# Patient Record
Sex: MALE | Race: WHITE | NOT HISPANIC OR LATINO | Employment: FULL TIME | URBAN - METROPOLITAN AREA
[De-identification: names, ages, dates, MRNs, and addresses within clinical notes are randomized per-mention and may not be internally consistent; named-entity substitution may affect disease eponyms.]

---

## 2017-11-14 ENCOUNTER — APPOINTMENT (EMERGENCY)
Dept: RADIOLOGY | Facility: HOSPITAL | Age: 37
End: 2017-11-14

## 2017-11-14 ENCOUNTER — HOSPITAL ENCOUNTER (EMERGENCY)
Facility: HOSPITAL | Age: 37
Discharge: HOME | End: 2017-11-14
Attending: EMERGENCY MEDICINE | Admitting: EMERGENCY MEDICINE

## 2017-11-14 VITALS
SYSTOLIC BLOOD PRESSURE: 159 MMHG | WEIGHT: 250 LBS | HEART RATE: 99 BPM | DIASTOLIC BLOOD PRESSURE: 93 MMHG | BODY MASS INDEX: 33.86 KG/M2 | TEMPERATURE: 98.4 F | OXYGEN SATURATION: 99 % | HEIGHT: 72 IN | RESPIRATION RATE: 20 BRPM

## 2017-11-14 DIAGNOSIS — M70.52 PATELLAR BURSITIS OF LEFT KNEE: Primary | ICD-10-CM

## 2017-11-14 PROCEDURE — 73564 X-RAY EXAM KNEE 4 OR MORE: CPT

## 2017-11-14 PROCEDURE — 99283 EMERGENCY DEPT VISIT LOW MDM: CPT

## 2017-11-14 RX ORDER — IBUPROFEN 600 MG/1
600 TABLET ORAL ONCE
Status: COMPLETED | OUTPATIENT
Start: 2017-11-14 | End: 2017-11-14

## 2017-11-14 RX ORDER — IBUPROFEN 600 MG/1
600 TABLET ORAL EVERY 6 HOURS PRN
Qty: 30 TABLET | Refills: 0 | Status: SHIPPED | OUTPATIENT
Start: 2017-11-14 | End: 2017-11-24

## 2017-11-14 RX ORDER — IBUPROFEN 600 MG/1
600 TABLET ORAL ONCE
Status: DISCONTINUED | OUTPATIENT
Start: 2017-11-14 | End: 2017-11-14 | Stop reason: HOSPADM

## 2017-11-14 RX ADMIN — IBUPROFEN 600 MG: 600 TABLET ORAL at 10:10

## 2017-11-14 NOTE — ED PROVIDER NOTES
History  Chief Complaint   Patient presents with    Knee Pain     Patient c/o of left knee pain that he states started 2 days ago, denies any acute injury  History provided by:  Patient  Knee Pain   Location:  Knee  Time since incident:  2 days  Injury: no    Knee location:  L knee  Pain details:     Quality:  Aching    Radiates to:  Does not radiate    Severity:  Moderate    Onset quality:  Gradual    Duration:  2 days    Timing:  Constant    Progression:  Worsening  Chronicity:  New  Dislocation: no    Prior injury to area:  Yes (had a prior staph infection years ago)  Relieved by:  None tried  Worsened by:  Extension and flexion  Ineffective treatments:  None tried  Associated symptoms: no back pain, no decreased ROM, no fatigue, no fever, no neck pain, no numbness, no swelling and no tingling    Risk factors: no concern for non-accidental trauma and no frequent fractures        None       History reviewed  No pertinent past medical history  Past Surgical History:   Procedure Laterality Date    KNEE SURGERY         History reviewed  No pertinent family history  I have reviewed and agree with the history as documented  Social History   Substance Use Topics    Smoking status: Never Smoker    Smokeless tobacco: Never Used    Alcohol use No        Review of Systems   Constitutional: Negative for fatigue and fever  Musculoskeletal: Negative for back pain and neck pain  All other systems reviewed and are negative  Physical Exam  ED Triage Vitals [11/14/17 0920]   Temperature Pulse Respirations Blood Pressure SpO2   98 4 °F (36 9 °C) 99 20 159/93 99 %      Temp src Heart Rate Source Patient Position - Orthostatic VS BP Location FiO2 (%)   -- -- -- Right arm --      Pain Score       6           Orthostatic Vital Signs  Vitals:    11/14/17 0920   BP: 159/93   Pulse: 99       Physical Exam   Constitutional: He is oriented to person, place, and time   He appears well-developed and well-nourished  No distress  HENT:   Head: Normocephalic and atraumatic  Eyes: Pupils are equal, round, and reactive to light  Neck: Neck supple  Cardiovascular: Normal rate and intact distal pulses  Pulmonary/Chest: Effort normal and breath sounds normal  No respiratory distress  Abdominal: Soft  Bowel sounds are normal    Musculoskeletal: Normal range of motion  Left knee: He exhibits normal range of motion, no swelling, no ecchymosis, no deformity, no laceration, no erythema and normal patellar mobility  Tenderness found  Patellar tendon tenderness noted  Left knee with small area of swelling just inferior to the patella in area of tendon and bursa, able to flex/extend left knee, no overlying erythema or cellulitis, mildly tender    Neurological: He is alert and oriented to person, place, and time  Skin: Skin is warm and dry  He is not diaphoretic  Psychiatric: He has a normal mood and affect  Nursing note and vitals reviewed        ED Medications  Medications   ibuprofen (MOTRIN) tablet 600 mg (not administered)   ibuprofen (MOTRIN) tablet 600 mg (not administered)       Diagnostic Studies  Results Reviewed     None                 XR knee 4+ vw left injury   ED Interpretation by Malissa Patel MD (11/14 1002)   NAD                 Procedures  Procedures       Phone Contacts  ED Phone Contact    ED Course  ED Course                                MDM  Number of Diagnoses or Management Options  Patellar bursitis of left knee: new and requires workup     Amount and/or Complexity of Data Reviewed  Tests in the radiology section of CPT®: reviewed and ordered      CritCare Time    Disposition  Final diagnoses:   Patellar bursitis of left knee     Time reflects when diagnosis was documented in both MDM as applicable and the Disposition within this note     Time User Action Codes Description Comment    11/14/2017 10:07 AM Mare IVY Add [M70 52] Patellar bursitis of left knee       ED Disposition     ED Disposition Condition Comment    Discharge  Cristobal Moe discharge to home/self care  Condition at discharge: Good        Follow-up Information     Follow up With Specialties Details Why [facility] Emergency Department Emergency Medicine   [address] [facility] [address]  [phone] [place] ED, [address] [place], [place], [address] [place] [address] MD [facility]   [address] [place] [id number]           Patient's Medications   Discharge Prescriptions    IBUPROFEN (MOTRIN) 600 MG TABLET    Take 1 tablet by mouth every 6 (six) hours as needed for mild pain for up to 10 days       Start Date: 11/14/2017End Date: 11/24/2017       Order Dose: 600 mg       Quantity: 30 tablet    Refills: 0     No discharge procedures on file      ED Provider  Electronically Signed by           Elmira Gifford MD  11/14/17 1441

## 2017-11-14 NOTE — DISCHARGE INSTRUCTIONS
Knee Bursitis   WHAT YOU NEED TO KNOW:   Knee bursitis is inflammation of the bursa in your knee  The bursa is a fluid-filled sac that acts as a cushion between a bone and a tendon  A tendon is a cord of strong tissue that connects muscles to bones  DISCHARGE INSTRUCTIONS:   Medicines:   · NSAIDs:  These medicines decrease swelling, pain, and fever  NSAIDs are available without a doctor's order  Ask your healthcare provider which medicine is right for you  Ask how much to take and when to take it  Take as directed  NSAIDs can cause stomach bleeding and kidney problems if not taken correctly  · Antibiotics: These help fight an infection caused by bacteria  You may need antibiotics if your bursitis is caused by infection  · Take your medicine as directed  Contact your healthcare provider if you think your medicine is not helping or if you have side effects  Tell him of her if you are allergic to any medicine  Keep a list of the medicines, vitamins, and herbs you take  Include the amounts, and when and why you take them  Bring the list or the pill bottles to follow-up visits  Carry your medicine list with you in case of an emergency  Manage your symptoms:   · Rest:  Rest your knee as much as possible to decrease pain and swelling  Slowly start to do more each day  Return to your daily activities as directed  · Ice:  Ice helps decrease swelling and pain  Ice may also help prevent tissue damage  Use an ice pack, or put crushed ice in a plastic bag  Cover it with a towel and place it on your knee for 15 to 20 minutes, 3 to 4 times each day, as directed  · Heat:  Heat helps decrease pain and stiffness  Apply heat on the area for 15 to 20 minutes, 3 to 4 times each day, as directed  · Compression:  Healthcare providers may wrap your knee with tape or an elastic bandage to decrease swelling  Loosen the elastic bandage if you start to lose feeling in your toes      · Elevation:  Raise your knee above the level of your heart as often as you can  This will help decrease swelling and pain  Prop your knee on pillows or blankets to keep it elevated comfortably  Physical therapy:  A physical therapist teaches you exercises to help improve movement and strength, and to decrease pain  Prevent another knee injury:   · Stretch, warm up, and cool down:  Always stretch and do warmup and cool-down exercises before and after you exercise  This will help loosen your muscles and decrease stress on your knees  Rest between workouts  · Protect your knees:  Use kneepads when you kneel on a hard surface and when you play sports  Stand and walk around every 20 minutes if you have to kneel for a long period of time  Follow up with your healthcare provider as directed:  Write down your questions so you remember to ask them during your visits  Contact your healthcare provider if:   · Your pain and swelling increase  · Your symptoms do not improve with treatment  · You have a fever  · You have questions or concerns about your condition or care  © 2017 [employer]  Information is for End User's use only and may not be sold, redistributed or otherwise used for commercial purposes  All illustrations and images included in CareNotes® are the copyrighted property of [employer]  or [employer]  The above information is an  only  It is not intended as medical advice for individual conditions or treatments  Talk to your doctor, nurse or pharmacist before following any medical regimen to see if it is safe and effective for you

## 2018-04-17 ENCOUNTER — APPOINTMENT (EMERGENCY)
Dept: RADIOLOGY | Facility: HOSPITAL | Age: 38
End: 2018-04-17

## 2018-04-17 ENCOUNTER — APPOINTMENT (EMERGENCY)
Dept: CT IMAGING | Facility: HOSPITAL | Age: 38
End: 2018-04-17

## 2018-04-17 ENCOUNTER — HOSPITAL ENCOUNTER (EMERGENCY)
Facility: HOSPITAL | Age: 38
Discharge: HOME | End: 2018-04-17
Attending: EMERGENCY MEDICINE

## 2018-04-17 VITALS
HEIGHT: 72 IN | SYSTOLIC BLOOD PRESSURE: 114 MMHG | TEMPERATURE: 97.9 F | RESPIRATION RATE: 16 BRPM | WEIGHT: 246.25 LBS | HEART RATE: 82 BPM | BODY MASS INDEX: 33.35 KG/M2 | OXYGEN SATURATION: 97 % | DIASTOLIC BLOOD PRESSURE: 81 MMHG

## 2018-04-17 DIAGNOSIS — R07.89 RIGHT-SIDED CHEST WALL PAIN: Primary | ICD-10-CM

## 2018-04-17 DIAGNOSIS — R73.9 HYPERGLYCEMIA: ICD-10-CM

## 2018-04-17 DIAGNOSIS — S00.81XA ABRASION OF FOREHEAD, INITIAL ENCOUNTER: ICD-10-CM

## 2018-04-17 DIAGNOSIS — R51.9 HEADACHE: ICD-10-CM

## 2018-04-17 LAB
ALBUMIN SERPL BCP-MCNC: 3.5 G/DL (ref 3.5–5)
ALP SERPL-CCNC: 74 U/L (ref 46–116)
ALT SERPL W P-5'-P-CCNC: 45 U/L (ref 12–78)
ANION GAP SERPL CALCULATED.3IONS-SCNC: 10 MMOL/L (ref 4–13)
AST SERPL W P-5'-P-CCNC: 24 U/L (ref 5–45)
ATRIAL RATE: 98 BPM
BASOPHILS # BLD AUTO: 0.03 THOUSANDS/ΜL (ref 0–0.1)
BASOPHILS NFR BLD AUTO: 0 % (ref 0–1)
BILIRUB SERPL-MCNC: 0.5 MG/DL (ref 0.2–1)
BUN SERPL-MCNC: 10 MG/DL (ref 5–25)
CALCIUM SERPL-MCNC: 8.2 MG/DL (ref 8.3–10.1)
CHLORIDE SERPL-SCNC: 107 MMOL/L (ref 100–108)
CO2 SERPL-SCNC: 26 MMOL/L (ref 21–32)
CREAT SERPL-MCNC: 1.02 MG/DL (ref 0.6–1.3)
EOSINOPHIL # BLD AUTO: 0.03 THOUSAND/ΜL (ref 0–0.61)
EOSINOPHIL NFR BLD AUTO: 0 % (ref 0–6)
ERYTHROCYTE [DISTWIDTH] IN BLOOD BY AUTOMATED COUNT: 12.5 % (ref 11.6–15.1)
GFR SERPL CREATININE-BSD FRML MDRD: 93 ML/MIN/1.73SQ M
GLUCOSE SERPL-MCNC: 205 MG/DL (ref 65–140)
HCT VFR BLD AUTO: 45.8 % (ref 36.5–49.3)
HGB BLD-MCNC: 15.9 G/DL (ref 12–17)
LYMPHOCYTES # BLD AUTO: 1.46 THOUSANDS/ΜL (ref 0.6–4.47)
LYMPHOCYTES NFR BLD AUTO: 21 % (ref 14–44)
MCH RBC QN AUTO: 29.9 PG (ref 26.8–34.3)
MCHC RBC AUTO-ENTMCNC: 34.7 G/DL (ref 31.4–37.4)
MCV RBC AUTO: 86 FL (ref 82–98)
MONOCYTES # BLD AUTO: 0.4 THOUSAND/ΜL (ref 0.17–1.22)
MONOCYTES NFR BLD AUTO: 6 % (ref 4–12)
NEUTROPHILS # BLD AUTO: 4.98 THOUSANDS/ΜL (ref 1.85–7.62)
NEUTS SEG NFR BLD AUTO: 72 % (ref 43–75)
NRBC BLD AUTO-RTO: 0 /100 WBCS
P AXIS: 39 DEGREES
PLATELET # BLD AUTO: 249 THOUSANDS/UL (ref 149–390)
PMV BLD AUTO: 9.4 FL (ref 8.9–12.7)
POTASSIUM SERPL-SCNC: 4.1 MMOL/L (ref 3.5–5.3)
PR INTERVAL: 150 MS
PROT SERPL-MCNC: 7.4 G/DL (ref 6.4–8.2)
QRS AXIS: -25 DEGREES
QRSD INTERVAL: 106 MS
QT INTERVAL: 344 MS
QTC INTERVAL: 439 MS
RBC # BLD AUTO: 5.32 MILLION/UL (ref 3.88–5.62)
SODIUM SERPL-SCNC: 143 MMOL/L (ref 136–145)
T WAVE AXIS: 27 DEGREES
TROPONIN I SERPL-MCNC: <0.02 NG/ML
VENTRICULAR RATE: 98 BPM
WBC # BLD AUTO: 6.94 THOUSAND/UL (ref 4.31–10.16)

## 2018-04-17 PROCEDURE — 80053 COMPREHEN METABOLIC PANEL: CPT | Performed by: EMERGENCY MEDICINE

## 2018-04-17 PROCEDURE — 36415 COLL VENOUS BLD VENIPUNCTURE: CPT

## 2018-04-17 PROCEDURE — 70450 CT HEAD/BRAIN W/O DYE: CPT

## 2018-04-17 PROCEDURE — 84484 ASSAY OF TROPONIN QUANT: CPT | Performed by: EMERGENCY MEDICINE

## 2018-04-17 PROCEDURE — 96374 THER/PROPH/DIAG INJ IV PUSH: CPT

## 2018-04-17 PROCEDURE — 99284 EMERGENCY DEPT VISIT MOD MDM: CPT

## 2018-04-17 PROCEDURE — 71260 CT THORAX DX C+: CPT

## 2018-04-17 PROCEDURE — 93005 ELECTROCARDIOGRAM TRACING: CPT

## 2018-04-17 PROCEDURE — 93010 ELECTROCARDIOGRAM REPORT: CPT | Performed by: INTERNAL MEDICINE

## 2018-04-17 PROCEDURE — 71046 X-RAY EXAM CHEST 2 VIEWS: CPT

## 2018-04-17 PROCEDURE — 96375 TX/PRO/DX INJ NEW DRUG ADDON: CPT

## 2018-04-17 PROCEDURE — 85025 COMPLETE CBC W/AUTO DIFF WBC: CPT | Performed by: EMERGENCY MEDICINE

## 2018-04-17 PROCEDURE — 96376 TX/PRO/DX INJ SAME DRUG ADON: CPT

## 2018-04-17 RX ORDER — FENTANYL CITRATE 50 UG/ML
50 INJECTION, SOLUTION INTRAMUSCULAR; INTRAVENOUS ONCE
Status: COMPLETED | OUTPATIENT
Start: 2018-04-17 | End: 2018-04-17

## 2018-04-17 RX ORDER — KETOROLAC TROMETHAMINE 30 MG/ML
15 INJECTION, SOLUTION INTRAMUSCULAR; INTRAVENOUS ONCE
Status: COMPLETED | OUTPATIENT
Start: 2018-04-17 | End: 2018-04-17

## 2018-04-17 RX ORDER — NAPROXEN 500 MG/1
500 TABLET ORAL 2 TIMES DAILY PRN
Qty: 20 TABLET | Refills: 0 | Status: SHIPPED | OUTPATIENT
Start: 2018-04-17

## 2018-04-17 RX ORDER — MORPHINE SULFATE 15 MG/1
15 TABLET ORAL EVERY 6 HOURS PRN
Qty: 5 TABLET | Refills: 0 | Status: SHIPPED | OUTPATIENT
Start: 2018-04-17 | End: 2018-04-24

## 2018-04-17 RX ADMIN — IOHEXOL 85 ML: 350 INJECTION, SOLUTION INTRAVENOUS at 02:34

## 2018-04-17 RX ADMIN — FENTANYL CITRATE 50 MCG: 50 INJECTION, SOLUTION INTRAMUSCULAR; INTRAVENOUS at 01:59

## 2018-04-17 RX ADMIN — FENTANYL CITRATE 50 MCG: 50 INJECTION, SOLUTION INTRAMUSCULAR; INTRAVENOUS at 03:34

## 2018-04-17 RX ADMIN — KETOROLAC TROMETHAMINE 15 MG: 30 INJECTION, SOLUTION INTRAMUSCULAR at 03:34

## 2018-04-17 NOTE — DISCHARGE INSTRUCTIONS
Abrasion   WHAT YOU NEED TO KNOW:   An abrasion is a scrape on your skin  It happens when your skin rubs against a rough surface  Some examples of an abrasion include rug burn, a skinned elbow, or road rash  Abrasions can be many shapes and sizes  The wound may hurt, bleed, bruise, or swell  DISCHARGE INSTRUCTIONS:   Return to the emergency department if:   · The bleeding does not stop after 10 minutes of firm pressure  · You cannot rinse one or more foreign objects out of your wound  · You have red streaks on your skin coming from your wound  Contact your healthcare provider if:   · You have a fever or chills  · Your abrasion is red, warm, swollen, or draining pus  · You have questions or concerns about your condition or care  Care for your abrasion:   · Wash your hands and dry them with a clean towel  · Press a clean cloth against your wound to stop any bleeding  · Rinse your wound with a lot of clean water  Do not use harsh soap, alcohol, or iodine solutions  · Use a clean, wet cloth to remove any objects, such as small pieces of rocks or dirt  · Rub antibiotic ointment on your wound  This may help prevent infection and help your wound heal     · Cover the wound with a non-stick bandage  Change the bandage daily, and if gets wet or dirty  Follow up with your healthcare provider as directed:  Write down your questions so you remember to ask them during your visits  © 2017 [employer] Information is for End User's use only and may not be sold, redistributed or otherwise used for commercial purposes  All illustrations and images included in CareNotes® are the copyrighted property of [employer]  or [employer]  The above information is an  only  It is not intended as medical advice for individual conditions or treatments   Talk to your doctor, nurse or pharmacist before following any medical regimen to see if it is safe and effective for you     Chest Wall Pain   WHAT YOU NEED TO KNOW:   Chest wall pain may be caused by problems with the muscles, cartilage, or bones of the chest wall  Chest wall pain may also be caused by pain that spreads to your chest from another part of your body  The pain may be aching, severe, dull, or sharp  It may come and go, or it may be constant  The pain may be worse when you move in certain ways, breathe deeply, or cough  DISCHARGE INSTRUCTIONS:   Call 911 if:   · You have any of the following signs of a heart attack:      ¨ Squeezing, pressure, or pain in your chest that lasts longer than 5 minutes or returns    ¨ Discomfort or pain in your back, neck, jaw, stomach, or arm     ¨ Trouble breathing    ¨ Nausea or vomiting    ¨ Lightheadedness or a sudden cold sweat, especially with chest pain or trouble breathing    Return to the emergency department if:   · You have severe pain  Contact your healthcare provider if:   · You develop a rash  · You have other new symptoms  · Your pain does not improve, even with treatment  · You have questions or concerns about your condition or care  Medicines: You may need any of the following:  · NSAIDs , such as ibuprofen, help decrease swelling, pain, and fever  This medicine is available with or without a doctor's order  NSAIDs can cause stomach bleeding or kidney problems in certain people  If you take blood thinner medicine, always ask your healthcare provider if NSAIDs are safe for you  Always read the medicine label and follow directions  · Acetaminophen  decreases pain  It is available without a doctor's order  Ask how much to take and how often to take it  Follow directions  Acetaminophen can cause liver damage if not taken correctly  · A cream  may be applied to your chest to decrease pain  · Take your medicine as directed  Contact your healthcare provider if you think your medicine is not helping or if you have side effects   Tell him of her if you are allergic to any medicine  Keep a list of the medicines, vitamins, and herbs you take  Include the amounts, and when and why you take them  Bring the list or the pill bottles to follow-up visits  Carry your medicine list with you in case of an emergency  Follow up with your healthcare provider as directed:  Write down your questions so you remember to ask them during your visits  Self-care:   · Rest  as needed  Avoid activities that make your chest wall pain worse  · Apply heat  on your chest for 20 to 30 minutes every 2 hours for as many days as directed  Heat helps decrease pain and muscle spasms  · Apply ice  on your chest for 15 to 20 minutes every hour or as directed  Use an ice pack, or put crushed ice in a plastic bag  Cover it with a towel  Ice helps prevent tissue damage and decreases swelling and pain  © 2017 [facility] Information is for End User's use only and may not be sold, redistributed or otherwise used for commercial purposes  All illustrations and images included in CareNotes® are the copyrighted property of [employer]  or [employer]  The above information is an  only  It is not intended as medical advice for individual conditions or treatments  Talk to your doctor, nurse or pharmacist before following any medical regimen to see if it is safe and effective for you  Head Injury   WHAT YOU NEED TO KNOW:   A head injury is most often caused by a blow to the head  This may occur from a fall, bicycle injury, sports injury, being struck in the head, or a motor vehicle accident  DISCHARGE INSTRUCTIONS:   Call 911 or have someone else call for any of the following:   · You cannot be woken  · You have a seizure  · You stop responding to others or you faint  · You have blurry or double vision  · Your speech becomes slurred or confused      · You have arm or leg weakness, loss of feeling, or new problems with coordination  · Your pupils are larger than usual or one pupil is a different size than the other  · You have blood or clear fluid coming out of your ears or nose  Return to the emergency department if:   · You have repeated or forceful vomiting  · You feel confused  · Your headache gets worse or becomes severe  · You or someone caring for you notices that you are harder to wake than usual   Contact your healthcare provider if:   · Your symptoms last longer than 6 weeks after the injury  · You have questions or concerns about your condition or care  Medicines:   · Acetaminophen  decreases pain  Acetaminophen is available without a doctor's order  Ask how much to take and how often to take it  Follow directions  Acetaminophen can cause liver damage if not taken correctly  · Take your medicine as directed  Contact your healthcare provider if you think your medicine is not helping or if you have side effects  Tell him or her if you are allergic to any medicine  Keep a list of the medicines, vitamins, and herbs you take  Include the amounts, and when and why you take them  Bring the list or the pill bottles to follow-up visits  Carry your medicine list with you in case of an emergency  Self-care:   · Rest  or do quiet activities for 24 to 48 hours  Limit your time watching TV, using the computer, or doing tasks that require a lot of thinking  Slowly return to your normal activities as directed  Do not play sports or do activities that may cause you to get hit in the head  Ask your healthcare provider when you can return to sports  · Apply ice  on your head for 15 to 20 minutes every hour or as directed  Use an ice pack, or put crushed ice in a plastic bag  Cover it with a towel before you apply it to your skin  Ice helps prevent tissue damage and decreases swelling and pain  · Have someone stay with you for 24 hours  or as directed   This person can monitor you for complications and call 911  When you are awake the person should ask you a few questions to see if you are thinking clearly  An example would be to ask your name or your address  Prevent another head injury:   · Wear a helmet that fits properly  Do this when you play sports, or ride a bike, scooter, or skateboard  Helmets help decrease your risk of a serious head injury  Talk to your healthcare provider about other ways you can protect yourself if you play sports  · Wear your seat belt every time you are in a car  This helps to decrease your risk for a head injury if you are in a car accident  Follow up with your healthcare provider as directed:  Write down your questions so you remember to ask them during your visits  © 2017 [address] [facility] Information is for End User's use only and may not be sold, redistributed or otherwise used for commercial purposes  All illustrations and images included in CareNotes® are the copyrighted property of [employer]  or [employer]  The above information is an  only  It is not intended as medical advice for individual conditions or treatments  Talk to your doctor, nurse or pharmacist before following any medical regimen to see if it is safe and effective for you

## 2018-04-17 NOTE — ED PROVIDER NOTES
History  Chief Complaint   Patient presents with   Cathy Dickersona     states "about 2 hrs ago   fell and hit head and lost concious for a few minutes" c/o head pain and right chest wall pain deies being on blood thinners and vomiting     History of Present Illness   44 y o  male presents to the ED after fall while leaving the bathroom approximately 3 hours ago  Patient affirms striking his head and affirms any loss of consciousness  Unclear if syncope prior to or after the episode  Patient states the fall occurred at home  Patient currently complains of right chest wall pain, denies any chest pain prior to the episode  No anticoagulation or antiplatelet agents  ROS: Patient denies any headache though he does not tenderness around an abrasion on the forehead, abdominal pain, extremity pain, fever/chills, nausea/vomiting, visual changes, diarrhea, dyspnea, cough, arthralgia, dysuria  All other systems reviewed and are negative  PHYSICAL EXAM:   Primary Exam   A: Patent   B: Bilateral equal breath sounds   C: Pulses intact in all extremities, no active bleeding   D: No signs of gross motor or cognitive neurologic impairment     Secondary Exam   Constitutional: No acute distress  HENT: Normocephalic  Abrasion with contusion noted on forehead with minimal tenderness  Normal pharyngeal exam  No hemotympanum, raccoon eyes or Mathis sign  Eyes: No hyphema  EOMI  PERRL  Neck: No midline tenderness, supple  No midline tenderness  CV: Regular rate and rhythm, no murmur  Peripheral pulses intact  Respiratory: No traumatic findings  Lungs clear to auscultation bilaterally  Chest discretely tender on anterior inferior ribs to palpitation, directly over the patient's pain though no traumatic findings on inspection  Abdomen: No traumatic findings  Soft, Non-tender, non-distended  Back: No vertebral tenderness, step-offs or crepitus  Skin: Normal color, warm and dry   Extremities: Non-tender, no deformities  Neuro: Awake, alert, no gross sensory or motor deficits     Medical Decision Making   50-year-old male presenting status post fall at home with unclear etiology of the patient's fall  Patient with discretely tender chest wall pain, denies preceding symptoms  Syncope with a broad differential, patient has no high risk factors for syncopal event  Will obtain cardiac evaluation considering post fall chest wall pain though this is most likely secondary to patient's fall  Place patient on cardiac monitoring and evaluate  CBC to evaluate for potential anemia  Regarding patient's fall, will obtain CT imaging of patient's head varied for intracranial pathology considering traumatic findings and unclear etiology of syncopal event  Obtain CT imaging of patient's chest considering fall with discretely tender pain associated with syncopal event  Will treat symptomatically and reassess  Will place patient on incentive spirometer in case of potential for rib fractures  Fall       Prior to Admission Medications   Prescriptions Last Dose Informant Patient Reported? Taking?   ibuprofen (MOTRIN) 600 mg tablet   No No   Sig: Take 1 tablet by mouth every 6 (six) hours as needed for mild pain for up to 10 days      Facility-Administered Medications: None       History reviewed  No pertinent past medical history  Past Surgical History:   Procedure Laterality Date    KNEE SURGERY         History reviewed  No pertinent family history  I have reviewed and agree with the history as documented  Social History   Substance Use Topics    Smoking status: Never Smoker    Smokeless tobacco: Never Used    Alcohol use No        Review of Systems   All other systems reviewed and are negative        Physical Exam  ED Triage Vitals   Temperature Pulse Respirations Blood Pressure SpO2   04/17/18 0114 04/17/18 0112 04/17/18 0112 04/17/18 0112 04/17/18 0113   97 9 °F (36 6 °C) 100 18 130/72 95 %      Temp Source Heart Rate Source Patient Position - Orthostatic VS BP Location FiO2 (%)   04/17/18 0114 04/17/18 0112 04/17/18 0415 04/17/18 0415 --   Oral Monitor Lying Right arm       Pain Score       04/17/18 0109       Worst Possible Pain           Orthostatic Vital Signs  Vitals:    04/17/18 0112 04/17/18 0415   BP: 130/72 114/81   Pulse: 100 82   Patient Position - Orthostatic VS:  Lying       Physical Exam    ED Medications  Medications   fentanyl citrate (PF) 100 MCG/2ML 50 mcg (50 mcg Intravenous Given 4/17/18 0159)   iohexol (OMNIPAQUE) 350 MG/ML injection (MULTI-DOSE) 100 mL (85 mL Intravenous Given 4/17/18 0234)   fentanyl citrate (PF) 100 MCG/2ML 50 mcg (50 mcg Intravenous Given 4/17/18 0334)   ketorolac (TORADOL) injection 15 mg (15 mg Intravenous Given 4/17/18 0334)       Diagnostic Studies  Results Reviewed     Procedure Component Value Units Date/Time    Troponin I [[id number]]  (Normal) Collected:  04/17/18 0128    Lab Status:  Final result Specimen:  Blood from Arm, Right Updated:  04/17/18 0154     Troponin I <0 02 ng/mL     Narrative:         [employer] Chemistry analyzer 99% cutoff is > 0 04 ng/mL in network labs    o cTnI 99% cutoff is useful only when applied to patients in the clinical setting of myocardial ischemia  o cTnI 99% cutoff should be interpreted in the context of clinical history, ECG findings and possibly cardiac imaging to establish correct diagnosis  o cTnI 99% cutoff may be suggestive but clearly not indicative of a coronary event without the clinical setting of myocardial ischemia      Comprehensive metabolic panel [[id number]]  (Abnormal) Collected:  04/17/18 0128    Lab Status:  Final result Specimen:  Blood from Arm, Right Updated:  04/17/18 0151     Sodium 143 mmol/L      Potassium 4 1 mmol/L      Chloride 107 mmol/L      CO2 26 mmol/L      Anion Gap 10 mmol/L      BUN 10 mg/dL      Creatinine 1 02 mg/dL      Glucose 205 (H) mg/dL      Calcium 8 2 (L) mg/dL      AST 24 U/L      ALT 45 U/L      Alkaline Phosphatase 74 U/L      Total Protein 7 4 g/dL      Albumin 3 5 g/dL      Total Bilirubin 0 50 mg/dL      eGFR 93 ml/min/1 73sq m     Narrative:         National Kidney Disease Education Program recommendations are as follows:  GFR calculation is accurate only with a steady state creatinine  Chronic Kidney disease less than 60 ml/min/1 73 sq  meters  Kidney failure less than 15 ml/min/1 73 sq  meters  CBC and differential [[id number]]  (Normal) Collected:  04/17/18 0128    Lab Status:  Final result Specimen:  Blood from Arm, Right Updated:  04/17/18 0136     WBC 6 94 Thousand/uL      RBC 5 32 Million/uL      Hemoglobin 15 9 g/dL      Hematocrit 45 8 %      MCV 86 fL      MCH 29 9 pg      MCHC 34 7 g/dL      RDW 12 5 %      MPV 9 4 fL      Platelets 546 Thousands/uL      nRBC 0 /100 WBCs      Neutrophils Relative 72 %      Lymphocytes Relative 21 %      Monocytes Relative 6 %      Eosinophils Relative 0 %      Basophils Relative 0 %      Neutrophils Absolute 4 98 Thousands/µL      Lymphocytes Absolute 1 46 Thousands/µL      Monocytes Absolute 0 40 Thousand/µL      Eosinophils Absolute 0 03 Thousand/µL      Basophils Absolute 0 03 Thousands/µL                  X-ray chest 2 views   Final Result by Belia Zavala MD (04/17 7188)      No acute cardiopulmonary disease  Workstation performed: [id number]         CT chest w contrast   ED Interpretation by Damien Wolf MD (04/17 9499)   IMPRESSION:   No acute findings  Final Result by Yaneth Fernandez MD (04/17 3650)      No acute thoracic process  No acute fracture  Minimal focal postinflammatory reticulonodular disease inferior posterior segment right upper lobe  Right 8th costochondral junction remote posttraumatic deformity  No overlying soft tissue swelling        Findings are consistent with the preliminary report from [facility] which was provided shortly after completion of the exam                   Workstation performed: [id number]         CT head without contrast   Final Result by Saskia Starkey MD (04/17 0256)      No acute intracranial process  No acute skull fracture  Incidentally noted disconjugate gaze with left globe lateral deviation  Correlate with clinical findings  Findings are consistent with the preliminary report from [facility] which was provided shortly after completion of the exam                            Workstation performed: [id number]                    Procedures  Procedures       Phone Contacts  ED Phone Contact    ED Course  ED Course as of Apr 23 2109   Tue Apr 17, 2018   0409  aware queried no matching patient's     8135 Discussed findings with the patient  Discussed need for follow-up with primary care physician regarding his glucose  Discussed symptomatic management in detail including continued use of incentive spirometer as potential for costochondritis could lead to pneumonia  Discussed these risks  Discussed risks and benefits of pain medication, including opiate use  Will prescribe small course of opiates due to potential risk for pneumonia if pain is not adequately controlled  Patient has old rib fracture and does note prior event sometime ago that was imaged with plain film x-ray  Discussed follow-up and return precautions in detail  7799 Patient ambulated around the emergency room without difficulty  Patient given incentive spirometer by nursing and instructed in its use                                  [facility]  CritCare Time    Disposition  Final diagnoses:   Right-sided chest wall pain   Headache   Abrasion of forehead, initial encounter   Hyperglycemia     Time reflects when diagnosis was documented in both MDM as applicable and the Disposition within this note     Time User Action Codes Description Comment    4/17/2018  2:53 AM Linard Stai Add [R07 89] Right-sided chest wall pain     4/17/2018  2:53 AM Linard Stai Add [R51] Headache 4/17/2018  2:53 AM Derrel Lory Ball [S00 81XA] Abrasion of forehead, initial encounter     4/17/2018  2:53 AM Derrel Lory Add [R73 9] Hyperglycemia       ED Disposition     ED Disposition Condition Comment    Discharge  Jber Nayaerger discharge to home/self care  Condition at discharge: Stable        MD Documentation    [address] Most Recent Value   Accepting Physician  Jai Name, [address]   Sending MD Sosa Ron      RN Documentation    [address], [address]   Bed Assignment  866   Report Given to  [facility], RN      Follow-up Information     Follow up With Specialties Details Why Contact Info    Vale Garcia MD Family Medicine In 3 days Follow-up and reassessment  Evaluate for potential diabetes  [address]          Discharge Medication List as of 4/17/2018  3:00 AM      START taking these medications    Details   naproxen (NAPROSYN) 500 mg tablet Take 1 tablet (500 mg total) by mouth 2 (two) times a day as needed for mild pain (take with food) for up to 20 doses, Starting Tue 4/17/2018, Print         CONTINUE these medications which have NOT CHANGED    Details   ibuprofen (MOTRIN) 600 mg tablet Take 1 tablet by mouth every 6 (six) hours as needed for mild pain for up to 10 days, Starting Tue 11/14/2017, Until Fri 11/24/2017, Print           No discharge procedures on file      ED Provider  Electronically Signed by           Yisel Grace MD  04/23/18 3832

## 2018-04-17 NOTE — ED NOTES
Right upper chest and axillary pain on palpation, inspiration and movement     Phylicia An RN  04/17/18 9803

## 2021-02-16 ENCOUNTER — HOSPITAL ENCOUNTER (EMERGENCY)
Facility: HOSPITAL | Age: 41
Discharge: HOME | End: 2021-02-16
Attending: EMERGENCY MEDICINE | Admitting: EMERGENCY MEDICINE
Payer: COMMERCIAL

## 2021-02-16 ENCOUNTER — APPOINTMENT (EMERGENCY)
Dept: RADIOLOGY | Facility: HOSPITAL | Age: 41
End: 2021-02-16
Payer: COMMERCIAL

## 2021-02-16 VITALS
DIASTOLIC BLOOD PRESSURE: 95 MMHG | SYSTOLIC BLOOD PRESSURE: 138 MMHG | BODY MASS INDEX: 33.49 KG/M2 | RESPIRATION RATE: 16 BRPM | TEMPERATURE: 98 F | HEART RATE: 92 BPM | WEIGHT: 246.91 LBS | OXYGEN SATURATION: 96 %

## 2021-02-16 DIAGNOSIS — M23.91 INTERNAL DERANGEMENT OF RIGHT KNEE: Primary | ICD-10-CM

## 2021-02-16 PROCEDURE — 99284 EMERGENCY DEPT VISIT MOD MDM: CPT | Performed by: PHYSICIAN ASSISTANT

## 2021-02-16 PROCEDURE — 99283 EMERGENCY DEPT VISIT LOW MDM: CPT

## 2021-02-16 PROCEDURE — 73564 X-RAY EXAM KNEE 4 OR MORE: CPT

## 2021-02-16 RX ORDER — IBUPROFEN 400 MG/1
400 TABLET ORAL EVERY 6 HOURS PRN
Qty: 30 TABLET | Refills: 0 | Status: SHIPPED | OUTPATIENT
Start: 2021-02-16 | End: 2021-02-21

## 2021-02-16 NOTE — ED NOTES
Knee immobilizer and crutches provided to the patient  Patient educated on the use of the knee immobilizer and use of crutches         Coleen Rutledge RN  02/16/21 8301

## 2021-02-16 NOTE — Clinical Note
Ruby Dupree was seen and treated in our emergency department on 2/16/2021             no walking or weight bearing on right knee for 7 days  Diagnosis:     Joie Joshua  may return to work on return date  He may return on this date: 02/17/2021         If you have any questions or concerns, please don't hesitate to call        Sandy Sarkar PA-C    ______________________________           _______________          _______________  Hospital Representative                              Date                                Time

## 2021-02-16 NOTE — ED PROVIDER NOTES
History  Chief Complaint   Patient presents with    Knee Pain     right sided knee pain after fall on sunday  No LOC, no head injury, no BT     38 y o  male with no significant past medical history presents to ED with chief complaint of right knee injury  Onset of symptoms reported as 2 days ago  Location of symptoms reported as right knee  Quality is reported as sharp pain  Severity is reported as moderate  Associated symptoms: denies paralysis, paraesthesia or weakness to R leg  Denies right ankle of foot pain  Denies hip pain  Modifying factors: walking and weight bearing exacerbate symptoms    Context: right sided knee pain after fall on sunday  No LOC, no head injury, no BT  Reviewed past medical history and visits via EPIC: patient last seen in ed on 4/17/2018 for evaluation of chest wall pain        History provided by:  Patient   used: No    Knee Pain  Associated symptoms: no back pain, no fatigue, no fever and no neck pain        Prior to Admission Medications   Prescriptions Last Dose Informant Patient Reported? Taking?   ibuprofen (MOTRIN) 600 mg tablet   No No   Sig: Take 1 tablet by mouth every 6 (six) hours as needed for mild pain for up to 10 days   naproxen (NAPROSYN) 500 mg tablet   No No   Sig: Take 1 tablet (500 mg total) by mouth 2 (two) times a day as needed for mild pain (take with food) for up to 20 doses      Facility-Administered Medications: None       History reviewed  No pertinent past medical history  Past Surgical History:   Procedure Laterality Date    KNEE SURGERY         History reviewed  No pertinent family history  I have reviewed and agree with the history as documented      E-Cigarette/Vaping     E-Cigarette/Vaping Substances     Social History     Tobacco Use    Smoking status: Never Smoker    Smokeless tobacco: Never Used   Substance Use Topics    Alcohol use: No    Drug use: No       Review of Systems   Constitutional: Negative for activity change, appetite change, chills, diaphoresis, fatigue, fever and unexpected weight change  HENT: Negative for congestion, dental problem, drooling, ear discharge, ear pain, facial swelling, hearing loss, mouth sores, nosebleeds, postnasal drip, rhinorrhea, sinus pressure, sinus pain, sneezing, sore throat, tinnitus, trouble swallowing and voice change  Eyes: Negative for photophobia, pain, discharge, redness, itching and visual disturbance  Respiratory: Negative for cough, chest tightness, shortness of breath and wheezing  Cardiovascular: Negative for chest pain, palpitations and leg swelling  Gastrointestinal: Negative for abdominal distention, abdominal pain, constipation, diarrhea, nausea and vomiting  Endocrine: Negative for cold intolerance, heat intolerance, polydipsia, polyphagia and polyuria  Genitourinary: Negative for difficulty urinating, dysuria, flank pain, frequency, hematuria and urgency  Musculoskeletal: Positive for arthralgias  Negative for back pain, joint swelling, myalgias, neck pain and neck stiffness  Skin: Negative for color change, pallor, rash and wound  Allergic/Immunologic: Negative for environmental allergies, food allergies and immunocompromised state  Neurological: Negative for dizziness, tremors, seizures, syncope, facial asymmetry, speech difficulty, weakness, light-headedness, numbness and headaches  Hematological: Negative for adenopathy  Does not bruise/bleed easily  Psychiatric/Behavioral: Negative for agitation, confusion and hallucinations  The patient is not nervous/anxious  All other systems reviewed and are negative  Physical Exam  Physical Exam  Vitals signs and nursing note reviewed  Constitutional:       General: He is not in acute distress  Appearance: Normal appearance  He is well-developed and normal weight  He is not ill-appearing or diaphoretic        Comments: /95   Pulse 92   Temp 98 °F (36 7 °C)   Resp 16   Wt 112 kg (246 lb 14 6 oz)   SpO2 96%   BMI 33 49 kg/m²      HENT:      Head: Normocephalic and atraumatic  Right Ear: Tympanic membrane, ear canal and external ear normal       Left Ear: Tympanic membrane, ear canal and external ear normal       Nose: Nose normal  No congestion or rhinorrhea  Mouth/Throat:      Mouth: Mucous membranes are moist       Pharynx: Oropharynx is clear  No pharyngeal swelling, oropharyngeal exudate or posterior oropharyngeal erythema  Eyes:      General: No scleral icterus  Right eye: No discharge  Left eye: No discharge  Extraocular Movements: Extraocular movements intact  Conjunctiva/sclera: Conjunctivae normal       Pupils: Pupils are equal, round, and reactive to light  Neck:      Musculoskeletal: Normal range of motion and neck supple  No neck rigidity or muscular tenderness  Thyroid: No thyromegaly  Vascular: No JVD  Trachea: No tracheal deviation  Cardiovascular:      Rate and Rhythm: Normal rate and regular rhythm  Pulses: Normal pulses  Heart sounds: S1 normal and S2 normal    Pulmonary:      Effort: Pulmonary effort is normal  No respiratory distress  Breath sounds: Normal breath sounds  No stridor  No wheezing, rhonchi or rales  Chest:      Chest wall: No tenderness  Abdominal:      General: Bowel sounds are normal  There is no distension  There are no signs of injury  Palpations: Abdomen is soft  There is no mass or pulsatile mass  Tenderness: There is no abdominal tenderness  There is no right CVA tenderness, left CVA tenderness, guarding or rebound  Hernia: No hernia is present  Musculoskeletal: Normal range of motion  General: Tenderness and signs of injury present  No swelling or deformity  Right lower leg: No edema  Left lower leg: No edema        Comments: Right Knee exam:  normal inspection, no gross deformity, no obvious muscle atrophy on inspection,  patella is midline without dislocation  Patella tendon is nontender to palpation  Medial jointline is tender to palpation  Lateral joint line is nontender to palpation  Posterior knee is nontender to palpation  There is no overlying erythema, warmth or obvious effusion present  Proximal fibula and proximal tibia are nontender to palpation  Posterior tibial pulse is 2/4 normal   No posterior calf pain  No palpable cords  [place] test:  squeeze of posterior calf produces plantar flexion of foot  right Ankle is normal to inspection, nontender to palpation with FROM  Right Hip is normal to inspection, nontender to palpation with FROM  SILT, NVI  Anterior drawers test:  negative for excessive laxity  Active ROM to knee is intact but painful        Lymphadenopathy:      Cervical: No cervical adenopathy  Skin:     General: Skin is warm and dry  Capillary Refill: Capillary refill takes less than 2 seconds  Coloration: Skin is not cyanotic, jaundiced, mottled or pale  Findings: No bruising, erythema, lesion or rash  Neurological:      General: No focal deficit present  Mental Status: He is alert and oriented to person, place, and time  Mental status is at baseline  Cranial Nerves: No cranial nerve deficit  Sensory: No sensory deficit  Motor: No weakness or abnormal muscle tone  Coordination: Coordination normal       Gait: Gait normal       Deep Tendon Reflexes: Reflexes normal    Psychiatric:         Mood and Affect: Mood normal  Mood is not anxious or depressed  Behavior: Behavior normal          Thought Content:  Thought content normal          Judgment: Judgment normal          Vital Signs  ED Triage Vitals [02/16/21 0724]   Temperature Pulse Respirations Blood Pressure SpO2   98 °F (36 7 °C) 92 16 138/95 96 %      Temp src Heart Rate Source Patient Position - Orthostatic VS BP Location FiO2 (%)   -- Monitor -- -- --      Pain Score       --           Vitals: 02/16/21 0724   BP: 138/95   Pulse: 92         Visual Acuity      ED Medications  Medications - No data to display    Diagnostic Studies  Results Reviewed     None                 XR knee 4+ views Right injury   Final Result by Sary Alvarado MD (02/16 9887)      No acute osseous abnormality  Workstation performed: [id number]                    Procedures  Procedures         ED Course                             SBIRT 22yo+      Most Recent Value   SBIRT (22 yo +)   In order to provide better care to our patients, we are screening all of our patients for alcohol and drug use  Would it be okay to ask you these screening questions? Yes Filed at: 02/16/2021 3358   Initial Alcohol Screen: US AUDIT-C    1  How often do you have a drink containing alcohol?  0 Filed at: 02/16/2021 0729   2  How many drinks containing alcohol do you have on a typical day you are drinking? 0 Filed at: 02/16/2021 0729   3a  Male UNDER 65: How often do you have five or more drinks on one occasion? 0 Filed at: 02/16/2021 0729   Audit-C Score  0 Filed at: 02/16/2021 7496   OLIVIA: How many times in the past year have you    Used an illegal drug or used a prescription medication for non-medical reasons? Never Filed at: 02/16/2021 0729   DAST-10: In the past 12 months      1  Have you used drugs other than those required for medical reasons? 0 Filed at: 02/16/2021 0729   2  Do you use more than one drug at a time? 0 Filed at: 02/16/2021 0729   3  Have you had medical problems as a result of your drug use (e g , memory loss, hepatitis, convulsions, bleeding, etc )? 0 Filed at: 02/16/2021 0729   4  Have you had "blackouts" or "flashbacks" as a result of drug use? YesNo  0 Filed at: 02/16/2021 0729   5  Do you ever feel bad or guilty about your drug use? 0 Filed at: 02/16/2021 0729   6  Does your spouse (or parent) ever complain about your involvement with drugs? 0 Filed at: 02/16/2021 0729   7   Have you neglected your family because of your use of drugs? 0 Filed at: 02/16/2021 0729   8  Have you engaged in illegal activities in order to obtain drugs? 0 Filed at: 02/16/2021 0729   9  Have you ever experienced withdrawal symptoms (felt sick) when you stopped taking drugs? 0 Filed at: 02/16/2021 0729   10  Are you always able to stop using drugs when you want to?  0 Filed at: 02/16/2021 0729   DAST-10 Score  0 Filed at: 02/16/2021 8240                    MDM  Number of Diagnoses or Management Options  Diagnosis management comments: differential diagnosis includes but is not limited to: Fracture, sprain, strain, internal injury, osteoarthritis, arthritis, dislocation, patellar injury, Baker's cyst   Plan check xray         Amount and/or Complexity of Data Reviewed  Tests in the radiology section of CPT®: ordered and reviewed  Discussion of test results with the performing providers: yes  Review and summarize past medical records: yes  Independent visualization of images, tracings, or specimens: yes    Risk of Complications, Morbidity, and/or Mortality  General comments: ED coarse:  50-year-old male presents to the emergency department with chief complaint of slip and fall on icy surface 2 days ago resulting in a twisting injury to his right knee  Denies head strike  No blood thinner use  Denies pain or injury to the right ankle foot or hip  Reports pain with ambulating on the right knee  Patient reports use of ibuprofen at home fail to resolve symptoms  Denies prior injury or fracture or surgery to the right knee in the past   ED x-rays independently visualized interpreted by me of the right knee demonstrate no acute fracture, no acute dislocation     Discussed with patient diagnosis of internal derangement of right knee  Discussed treatment plan including rest, ice, elevation, use of crutches and knee immobilizer  Discussed use of NSAIDs  Discussed expected course    Discussed follow up with primary care physician and Sports Medicine or Orthopedics in 3-5 days recheck and further evaluation of symptoms  Reviewed reasons to return to ed  Patient verbalized understanding of diagnosis and agreement with discharge plan of care as well as understanding of reasons to return to ed        Patient was seen during the outbreak of the corona virus epidemic   Resources are limited due to the severity of patient illnesses associated with virus   Testing is also limited at this time   Discussed with patient at the time of this evaluation   Due to the fact that limited resources are available -treatment options are limited  Splint check: location R knee,   Type static knee immobilizer, SILT, NVI, cap refill less than 3 seconds  Skin intact without redness or breakdown  Splint applied by ed tech  Splint checked by me  Patient Progress  Patient progress: stable      Disposition  Final diagnoses:   Internal derangement of right knee     Time reflects when diagnosis was documented in both MDM as applicable and the Disposition within this note     Time User Action Codes Description Comment    2/16/2021  8:14 AM Sergey Macias Add [M23 91] Internal derangement of right knee       ED Disposition     ED Disposition Condition Date/Time Comment    Discharge Stable Tue Feb 16, 2021  8:14 AM Lyubov Reynolds discharge to home/self care              Follow-up Information     Follow up With Specialties Details Why Contact Info Additional [address] [facility] Emergency Department Emergency Medicine Go to  If symptoms worsen [address] [facility] [id number] [facility] Emergency Department, [address], [place], [address] [facility] Renetta Colby MD Family Medicine Call in 2 days for further evaluation of symptoms [address] [facility],[address]  [facility] [phone]       Tamar Carrillo DO Sports Medicine Call in 3 days for further evaluation of symptoms [address] [address]  [phone]             Discharge Medication List as of 2/16/2021  8:16 AM      CONTINUE these medications which have CHANGED    Details   ibuprofen (MOTRIN) 400 mg tablet Take 1 tablet (400 mg total) by mouth every 6 (six) hours as needed for moderate pain (knee pain/initial rx ) for up to 5 days, Starting Tue 2/16/2021, Until Sun 2/21/2021, Print         CONTINUE these medications which have NOT CHANGED    Details   naproxen (NAPROSYN) 500 mg tablet Take 1 tablet (500 mg total) by mouth 2 (two) times a day as needed for mild pain (take with food) for up to 20 doses, Starting Tue 4/17/2018, Print           No discharge procedures on file      PDMP Review     None          ED Provider  Electronically Signed by           Jennifer Hernández PA-C  02/16/21 7569

## 2021-02-16 NOTE — ED NOTES
Discharge instructions reviewed, patient has no new complaints or concerns at time of discharge  Patient ambulated out of department on crutches without difficulty         Heber Mcintyre RN  02/16/21 0149

## 2021-04-09 DIAGNOSIS — Z23 ENCOUNTER FOR IMMUNIZATION: ICD-10-CM

## 2021-04-20 ENCOUNTER — APPOINTMENT (EMERGENCY)
Dept: RADIOLOGY | Facility: HOSPITAL | Age: 41
End: 2021-04-20
Payer: COMMERCIAL

## 2021-04-20 ENCOUNTER — APPOINTMENT (EMERGENCY)
Dept: CT IMAGING | Facility: HOSPITAL | Age: 41
End: 2021-04-20
Payer: COMMERCIAL

## 2021-04-20 ENCOUNTER — HOSPITAL ENCOUNTER (EMERGENCY)
Facility: HOSPITAL | Age: 41
Discharge: HOME | End: 2021-04-21
Attending: EMERGENCY MEDICINE | Admitting: EMERGENCY MEDICINE
Payer: COMMERCIAL

## 2021-04-20 DIAGNOSIS — J12.82 PNEUMONIA DUE TO COVID-19 VIRUS: Primary | ICD-10-CM

## 2021-04-20 DIAGNOSIS — R06.02 SHORTNESS OF BREATH: ICD-10-CM

## 2021-04-20 DIAGNOSIS — U07.1 PNEUMONIA DUE TO COVID-19 VIRUS: Primary | ICD-10-CM

## 2021-04-20 LAB
ALBUMIN SERPL BCP-MCNC: 3.2 G/DL (ref 3.5–5)
ALP SERPL-CCNC: 96 U/L (ref 46–116)
ALT SERPL W P-5'-P-CCNC: 73 U/L (ref 12–78)
ANION GAP SERPL CALCULATED.3IONS-SCNC: 12 MMOL/L (ref 4–13)
AST SERPL W P-5'-P-CCNC: 39 U/L (ref 5–45)
ATRIAL RATE: 87 BPM
BASOPHILS # BLD AUTO: 0.01 THOUSANDS/ΜL (ref 0–0.1)
BASOPHILS NFR BLD AUTO: 0 % (ref 0–1)
BILIRUB SERPL-MCNC: 0.73 MG/DL (ref 0.2–1)
BUN SERPL-MCNC: 18 MG/DL (ref 5–25)
CALCIUM ALBUM COR SERPL-MCNC: 8.7 MG/DL (ref 8.3–10.1)
CALCIUM SERPL-MCNC: 8.1 MG/DL (ref 8.3–10.1)
CHLORIDE SERPL-SCNC: 105 MMOL/L (ref 100–108)
CO2 SERPL-SCNC: 23 MMOL/L (ref 21–32)
CREAT SERPL-MCNC: 0.87 MG/DL (ref 0.6–1.3)
D DIMER PPP FEU-MCNC: 0.63 UG/ML FEU
EOSINOPHIL # BLD AUTO: 0.02 THOUSAND/ΜL (ref 0–0.61)
EOSINOPHIL NFR BLD AUTO: 1 % (ref 0–6)
ERYTHROCYTE [DISTWIDTH] IN BLOOD BY AUTOMATED COUNT: 12.7 % (ref 11.6–15.1)
FLUAV RNA RESP QL NAA+PROBE: NEGATIVE
FLUBV RNA RESP QL NAA+PROBE: NEGATIVE
GFR SERPL CREATININE-BSD FRML MDRD: 107 ML/MIN/1.73SQ M
GLUCOSE SERPL-MCNC: 137 MG/DL (ref 65–140)
HCT VFR BLD AUTO: 42.8 % (ref 36.5–49.3)
HGB BLD-MCNC: 14.5 G/DL (ref 12–17)
IMM GRANULOCYTES # BLD AUTO: 0.01 THOUSAND/UL (ref 0–0.2)
IMM GRANULOCYTES NFR BLD AUTO: 0 % (ref 0–2)
LYMPHOCYTES # BLD AUTO: 1.54 THOUSANDS/ΜL (ref 0.6–4.47)
LYMPHOCYTES NFR BLD AUTO: 36 % (ref 14–44)
MCH RBC QN AUTO: 29.5 PG (ref 26.8–34.3)
MCHC RBC AUTO-ENTMCNC: 33.9 G/DL (ref 31.4–37.4)
MCV RBC AUTO: 87 FL (ref 82–98)
MONOCYTES # BLD AUTO: 0.5 THOUSAND/ΜL (ref 0.17–1.22)
MONOCYTES NFR BLD AUTO: 12 % (ref 4–12)
NEUTROPHILS # BLD AUTO: 2.26 THOUSANDS/ΜL (ref 1.85–7.62)
NEUTS SEG NFR BLD AUTO: 51 % (ref 43–75)
NRBC BLD AUTO-RTO: 0 /100 WBCS
P AXIS: 38 DEGREES
PLATELET # BLD AUTO: 183 THOUSANDS/UL (ref 149–390)
PMV BLD AUTO: 9.3 FL (ref 8.9–12.7)
POTASSIUM SERPL-SCNC: 3.5 MMOL/L (ref 3.5–5.3)
PR INTERVAL: 126 MS
PROT SERPL-MCNC: 7.3 G/DL (ref 6.4–8.2)
QRS AXIS: 108 DEGREES
QRSD INTERVAL: 102 MS
QT INTERVAL: 368 MS
QTC INTERVAL: 442 MS
RBC # BLD AUTO: 4.92 MILLION/UL (ref 3.88–5.62)
RSV RNA RESP QL NAA+PROBE: NEGATIVE
SARS-COV-2 RNA RESP QL NAA+PROBE: POSITIVE
SODIUM SERPL-SCNC: 140 MMOL/L (ref 136–145)
T WAVE AXIS: -1 DEGREES
TROPONIN I SERPL-MCNC: <0.02 NG/ML
TROPONIN I SERPL-MCNC: <0.02 NG/ML
VENTRICULAR RATE: 87 BPM
WBC # BLD AUTO: 4.34 THOUSAND/UL (ref 4.31–10.16)

## 2021-04-20 PROCEDURE — 36415 COLL VENOUS BLD VENIPUNCTURE: CPT

## 2021-04-20 PROCEDURE — 93005 ELECTROCARDIOGRAM TRACING: CPT

## 2021-04-20 PROCEDURE — 71045 X-RAY EXAM CHEST 1 VIEW: CPT

## 2021-04-20 PROCEDURE — 71275 CT ANGIOGRAPHY CHEST: CPT

## 2021-04-20 PROCEDURE — 80053 COMPREHEN METABOLIC PANEL: CPT | Performed by: EMERGENCY MEDICINE

## 2021-04-20 PROCEDURE — 93010 ELECTROCARDIOGRAM REPORT: CPT | Performed by: INTERNAL MEDICINE

## 2021-04-20 PROCEDURE — 85379 FIBRIN DEGRADATION QUANT: CPT | Performed by: EMERGENCY MEDICINE

## 2021-04-20 PROCEDURE — 99285 EMERGENCY DEPT VISIT HI MDM: CPT

## 2021-04-20 PROCEDURE — 0241U HB NFCT DS VIR RESP RNA 4 TRGT: CPT | Performed by: EMERGENCY MEDICINE

## 2021-04-20 PROCEDURE — 85025 COMPLETE CBC W/AUTO DIFF WBC: CPT | Performed by: EMERGENCY MEDICINE

## 2021-04-20 PROCEDURE — 84484 ASSAY OF TROPONIN QUANT: CPT | Performed by: EMERGENCY MEDICINE

## 2021-04-20 PROCEDURE — 99284 EMERGENCY DEPT VISIT MOD MDM: CPT | Performed by: EMERGENCY MEDICINE

## 2021-04-20 RX ORDER — DOXYCYCLINE HYCLATE 100 MG/1
100 CAPSULE ORAL 2 TIMES DAILY
Qty: 10 CAPSULE | Refills: 0 | Status: SHIPPED | OUTPATIENT
Start: 2021-04-20 | End: 2021-04-25

## 2021-04-20 RX ADMIN — IOHEXOL 100 ML: 350 INJECTION, SOLUTION INTRAVENOUS at 21:55

## 2021-04-21 VITALS
TEMPERATURE: 97.8 F | RESPIRATION RATE: 18 BRPM | HEART RATE: 77 BPM | BODY MASS INDEX: 33.49 KG/M2 | WEIGHT: 246.91 LBS | DIASTOLIC BLOOD PRESSURE: 81 MMHG | OXYGEN SATURATION: 95 % | SYSTOLIC BLOOD PRESSURE: 124 MMHG

## 2021-04-21 NOTE — ED PROVIDER NOTES
Pt Name: Orlando Hernandez  MRN: [id number]  [place] 1980  Age/Sex: 38 y o  male  Date of evaluation: 4/20/2021  PCP: No primary care provider on file  CHIEF COMPLAINT    Chief Complaint   Patient presents with    Shortness of Breath     reports SOB since saturday, pain with inspiration, BANSAL  reports unable to take a deep breath in  HPI    38 y o  male presenting with shortness of breath  Patient states since this past Saturday he feels it is difficult to take a deep breath in  It is worse with exertion  He has chest pain upon deep inspiration  No nausea or vomiting  No fevers or chills  Denies cough  No sick contacts  No recent traveling, no surgeries  Did not receive his COVID-19 vaccination yet  No obvious exposures  He feels fine at rest           Past Medical and Surgical History    History reviewed  No pertinent past medical history  Past Surgical History:   Procedure Laterality Date    KNEE SURGERY         History reviewed  No pertinent family history  Social History     Tobacco Use    Smoking status: Never Smoker    Smokeless tobacco: Never Used   Substance Use Topics    Alcohol use: No    Drug use: No           Allergies    Allergies   Allergen Reactions    Percocet [Oxycodone-Acetaminophen] Hives       Home Medications    Prior to Admission medications    Medication Sig Start Date End Date Taking? Authorizing Provider   ibuprofen (MOTRIN) 400 mg tablet Take 1 tablet (400 mg total) by mouth every 6 (six) hours as needed for moderate pain (knee pain/initial rx ) for up to 5 days 2/16/21 2/21/21  Johny Castro PA-C   naproxen (NAPROSYN) 500 mg tablet Take 1 tablet (500 mg total) by mouth 2 (two) times a day as needed for mild pain (take with food) for up to 20 doses 4/17/18   Fran Medley MD           Review of Systems    Review of Systems   Constitutional: Negative for chills and fever  HENT: Negative for rhinorrhea and sore throat      Eyes: Negative for pain and visual disturbance  Respiratory: Positive for shortness of breath  Negative for wheezing  Cardiovascular: Positive for chest pain  Negative for leg swelling  Gastrointestinal: Negative for abdominal pain, nausea and vomiting  Genitourinary: Negative for dysuria and hematuria  Musculoskeletal: Negative for back pain and myalgias  Skin: Negative for rash and wound  Neurological: Negative for syncope and headaches  Physical Exam      ED Triage Vitals   Temperature Pulse Respirations Blood Pressure SpO2   04/20/21 1912 04/20/21 1913 04/20/21 1913 04/20/21 1913 04/20/21 1913   97 8 °F (36 6 °C) 86 17 155/98 96 %      Temp Source Heart Rate Source Patient Position - Orthostatic VS BP Location FiO2 (%)   04/20/21 1912 04/20/21 1913 04/20/21 1913 04/20/21 1913 --   Temporal Monitor Sitting Left arm       Pain Score       --                      Physical Exam  Constitutional:       General: He is not in acute distress  Appearance: He is not ill-appearing  HENT:      Head: Normocephalic and atraumatic  Nose: Nose normal    Eyes:      Extraocular Movements: Extraocular movements intact  Pupils: Pupils are equal, round, and reactive to light  Neck:      Musculoskeletal: Normal range of motion and neck supple  Cardiovascular:      Rate and Rhythm: Normal rate and regular rhythm  Pulmonary:      Effort: No respiratory distress  Breath sounds: Normal breath sounds  No wheezing  Abdominal:      General: There is no distension  Palpations: Abdomen is soft  Tenderness: There is no abdominal tenderness  Musculoskeletal: Normal range of motion  General: No swelling or deformity  Skin:     General: Skin is warm  Findings: No erythema  Neurological:      Mental Status: He is alert and oriented to person, place, and time  Mental status is at baseline                Diagnostic Results      Labs:    Results Reviewed     Procedure Component Value Units Date/Time    COVID19, Influenza A/B, RSV PCR, [facility] [[id number]]  (Abnormal) Collected: 04/20/21 2229    Lab Status: Final result Specimen: Nares from Nose Updated: 04/20/21 2317     SARS-CoV-2 Positive     INFLUENZA A PCR Negative     INFLUENZA B PCR Negative     RSV PCR Negative    Narrative: This test has been authorized by FDA under an EUA (Emergency Use Assay) for use by authorized laboratories  Clinical caution and judgement should be used with the interpretation of these results with consideration of the clinical impression and other laboratory testing  Testing reported as "Positive" or "Negative" has been proven to be accurate according to standard laboratory validation requirements  All testing is performed with control materials showing appropriate reactivity at standard intervals      Troponin I repeat in 3hrs [[id number]]  (Normal) Collected: 04/20/21 2207    Lab Status: Final result Specimen: Blood from Arm, Left Updated: 04/20/21 2228     Troponin I <0 02 ng/mL     D-Dimer [[id number]]  (Abnormal) Collected: 04/20/21 1915    Lab Status: Final result Specimen: Blood from Arm, Right Updated: 04/20/21 2119     D-Dimer, Quant 0 63 ug/ml FEU     Comprehensive metabolic panel [[id number]]  (Abnormal) Collected: 04/20/21 1915    Lab Status: Final result Specimen: Blood from Arm, Right Updated: 04/20/21 1945     Sodium 140 mmol/L      Potassium 3 5 mmol/L      Chloride 105 mmol/L      CO2 23 mmol/L      ANION GAP 12 mmol/L      BUN 18 mg/dL      Creatinine 0 87 mg/dL      Glucose 137 mg/dL      Calcium 8 1 mg/dL      Corrected Calcium 8 7 mg/dL      AST 39 U/L      ALT 73 U/L      Alkaline Phosphatase 96 U/L      Total Protein 7 3 g/dL      Albumin 3 2 g/dL      Total Bilirubin 0 73 mg/dL      eGFR 107 ml/min/1 73sq m     Narrative:      [place] guidelines for Chronic Kidney Disease (CKD):     Stage 1 with normal or high GFR (GFR > 90 mL/min/1 73 square meters)    Stage 2 Mild CKD (GFR = 60-89 mL/min/1 73 square meters)    Stage 3A Moderate CKD (GFR = 45-59 mL/min/1 73 square meters)    Stage 3B Moderate CKD (GFR = 30-44 mL/min/1 73 square meters)    Stage 4 Severe CKD (GFR = 15-29 mL/min/1 73 square meters)    Stage 5 End Stage CKD (GFR <15 mL/min/1 73 square meters)  Note: GFR calculation is accurate only with a steady state creatinine    Troponin I [[id number]]  (Normal) Collected: 04/20/21 1915    Lab Status: Final result Specimen: Blood from Arm, Right Updated: 04/20/21 1938     Troponin I <0 02 ng/mL     CBC and differential [[id number]] Collected: 04/20/21 1915    Lab Status: Final result Specimen: Blood from Arm, Right Updated: 04/20/21 1922     WBC 4 34 Thousand/uL      RBC 4 92 Million/uL      Hemoglobin 14 5 g/dL      Hematocrit 42 8 %      MCV 87 fL      MCH 29 5 pg      MCHC 33 9 g/dL      RDW 12 7 %      MPV 9 3 fL      Platelets 881 Thousands/uL      nRBC 0 /100 WBCs      Neutrophils Relative 51 %      Immat GRANS % 0 %      Lymphocytes Relative 36 %      Monocytes Relative 12 %      Eosinophils Relative 1 %      Basophils Relative 0 %      Neutrophils Absolute 2 26 Thousands/µL      Immature Grans Absolute 0 01 Thousand/uL      Lymphocytes Absolute 1 54 Thousands/µL      Monocytes Absolute 0 50 Thousand/µL      Eosinophils Absolute 0 02 Thousand/µL      Basophils Absolute 0 01 Thousands/µL           All labs reviewed and utilized in the medical decision making process    Radiology:    CTA ED chest PE study   Final Result      Multifocal pneumonia  No evidence of pulmonary embolism  Workstation performed: [id number]         XR chest 1 view portable    (Results Pending)       All radiology studies independently viewed by me and interpreted by the radiologist     Procedure    Procedures        MDM    Patient's symptoms due to COVID-19 infection, has COVID-19 pneumonia    Low likelihood of superimposed bacterial pneumonia, however will cover with doxycycline  Patient advised to quarantine, may use multivitamins, self [place] at home  Virtual follow-up with PCP  Strict ED return precautions discussed  He ambulatory pulse ox greater than 92%  Currently stable for discharge  ED Course as of Apr 20 2349 Tue Apr 20, 2021 2226 Multifocal groundglass and consolidative lung opacities suggesting multifocal pneumonia possibly Covid 19 related  Will swab for covid  No PE on CT  CTA ED chest PE study   2321 SARS-COV-2(!): Positive      EKG shows normal sinus rhythm heart rate of 87, narrow QRS, intervals within normal limits, no ST elevation, no significant ST depression  Medications   iohexol (OMNIPAQUE) 350 MG/ML injection (SINGLE-DOSE) 100 mL (100 mL Intravenous Given 4/20/21 2155)           FINAL IMPRESSION    Final diagnoses:   Pneumonia due to COVID-19 virus   Shortness of breath         DISPOSITION    Time reflects when diagnosis was documented in both MDM as applicable and the Disposition within this note     Time User Action Codes Description Comment    4/20/2021 11:26 PM Joel Antony Add [U07 1,  J12 82] Pneumonia due to COVID-19 virus     4/20/2021 11:26 PM Joel Antony Add [R06 02] Shortness of breath       ED Disposition     ED Disposition Condition Date/Time Comment    Discharge Stable Tue Apr 20, 2021 11:26 PM Doc Din discharge to home/self care  Follow-up Information     Follow up With Specialties Details Why Contact Info    [facility]  Call in 1 day  [phone]              PATIENT REFERRED TO:    [facility]  [phone]    Call in 1 day        DISCHARGE MEDICATIONS:    Patient's Medications   Discharge Prescriptions    DOXYCYCLINE HYCLATE (VIBRAMYCIN) 100 MG CAPSULE    Take 1 capsule (100 mg total) by mouth 2 (two) times a day for 5 days       Start Date: 4/20/2021 End Date: 4/25/2021       Order Dose: 100 mg       Quantity: 10 capsule    Refills: 0       No discharge procedures on file           Martin Bah DO        This note was partially completed using voice recognition technology, and was scanned for gross errors; however some errors may still exist  Please contact the author with any questions or requests for clarification        Adrian Timmons DO  04/20/21 7017

## 2021-04-21 NOTE — DISCHARGE INSTRUCTIONS

## 2021-04-22 ENCOUNTER — TELEPHONE (OUTPATIENT)
Dept: FAMILY MEDICINE CLINIC | Facility: CLINIC | Age: 41
End: 2021-04-22

## 2021-04-22 NOTE — TELEPHONE ENCOUNTER
----- Message from Finn Basilio DO sent at 4/20/2021 11:35 PM EDT -----  Regarding: Covid-19 positive  Hey, I have a high risk patient who meets eligible criteria for monoclonal antibody therapy      Thank you

## 2021-04-22 NOTE — TELEPHONE ENCOUNTER
Spoke with [facility] regarding positive testing for Covid  We discussed the option for having the infusion  He wants to talk this over with his wife and if he chooses to do so, he will call the office to schedule with a provider

## 2022-12-05 ENCOUNTER — HOSPITAL ENCOUNTER (EMERGENCY)
Facility: HOSPITAL | Age: 42
Discharge: HOME | End: 2022-12-05
Attending: EMERGENCY MEDICINE

## 2022-12-05 ENCOUNTER — APPOINTMENT (EMERGENCY)
Dept: CT IMAGING | Facility: HOSPITAL | Age: 42
End: 2022-12-05

## 2022-12-05 VITALS
OXYGEN SATURATION: 97 % | DIASTOLIC BLOOD PRESSURE: 100 MMHG | SYSTOLIC BLOOD PRESSURE: 138 MMHG | HEART RATE: 89 BPM | TEMPERATURE: 98.6 F | RESPIRATION RATE: 18 BRPM

## 2022-12-05 DIAGNOSIS — N20.1 RIGHT URETERAL STONE: Primary | ICD-10-CM

## 2022-12-05 LAB
ALBUMIN SERPL BCP-MCNC: 3.8 G/DL (ref 3.5–5)
ALP SERPL-CCNC: 94 U/L (ref 46–116)
ALT SERPL W P-5'-P-CCNC: 41 U/L (ref 12–78)
ANION GAP SERPL CALCULATED.3IONS-SCNC: 12 MMOL/L (ref 4–13)
AST SERPL W P-5'-P-CCNC: 28 U/L (ref 5–45)
BACTERIA UR QL AUTO: NORMAL /HPF
BASOPHILS # BLD AUTO: 0.02 THOUSANDS/ÂΜL (ref 0–0.1)
BASOPHILS NFR BLD AUTO: 0 % (ref 0–1)
BILIRUB DIRECT SERPL-MCNC: 0.1 MG/DL (ref 0–0.2)
BILIRUB SERPL-MCNC: 0.44 MG/DL (ref 0.2–1)
BILIRUB UR QL STRIP: NEGATIVE
BUN SERPL-MCNC: 29 MG/DL (ref 5–25)
CALCIUM SERPL-MCNC: 8.9 MG/DL (ref 8.3–10.1)
CHLORIDE SERPL-SCNC: 104 MMOL/L (ref 96–108)
CLARITY UR: CLEAR
CO2 SERPL-SCNC: 25 MMOL/L (ref 21–32)
COLOR UR: COLORLESS
CREAT SERPL-MCNC: 1.25 MG/DL (ref 0.6–1.3)
EOSINOPHIL # BLD AUTO: 0.06 THOUSAND/ÂΜL (ref 0–0.61)
EOSINOPHIL NFR BLD AUTO: 1 % (ref 0–6)
ERYTHROCYTE [DISTWIDTH] IN BLOOD BY AUTOMATED COUNT: 13 % (ref 11.6–15.1)
GFR SERPL CREATININE-BSD FRML MDRD: 70 ML/MIN/1.73SQ M
GLUCOSE SERPL-MCNC: 141 MG/DL (ref 65–140)
GLUCOSE UR STRIP-MCNC: NEGATIVE MG/DL
HCT VFR BLD AUTO: 44.5 % (ref 36.5–49.3)
HGB BLD-MCNC: 14.8 G/DL (ref 12–17)
HGB UR QL STRIP.AUTO: ABNORMAL
IMM GRANULOCYTES # BLD AUTO: 0.03 THOUSAND/UL (ref 0–0.2)
IMM GRANULOCYTES NFR BLD AUTO: 0 % (ref 0–2)
KETONES UR STRIP-MCNC: NEGATIVE MG/DL
LEUKOCYTE ESTERASE UR QL STRIP: NEGATIVE
LIPASE SERPL-CCNC: 226 U/L (ref 73–393)
LYMPHOCYTES # BLD AUTO: 1.3 THOUSANDS/ÂΜL (ref 0.6–4.47)
LYMPHOCYTES NFR BLD AUTO: 17 % (ref 14–44)
MCH RBC QN AUTO: 29.2 PG (ref 26.8–34.3)
MCHC RBC AUTO-ENTMCNC: 33.3 G/DL (ref 31.4–37.4)
MCV RBC AUTO: 88 FL (ref 82–98)
MONOCYTES # BLD AUTO: 0.51 THOUSAND/ÂΜL (ref 0.17–1.22)
MONOCYTES NFR BLD AUTO: 7 % (ref 4–12)
NEUTROPHILS # BLD AUTO: 5.72 THOUSANDS/ÂΜL (ref 1.85–7.62)
NEUTS SEG NFR BLD AUTO: 75 % (ref 43–75)
NITRITE UR QL STRIP: NEGATIVE
NON-SQ EPI CELLS URNS QL MICRO: NORMAL /HPF
NRBC BLD AUTO-RTO: 0 /100 WBCS
PH UR STRIP.AUTO: 6.5 [PH]
PLATELET # BLD AUTO: 214 THOUSANDS/UL (ref 149–390)
PMV BLD AUTO: 9.7 FL (ref 8.9–12.7)
POTASSIUM SERPL-SCNC: 4.2 MMOL/L (ref 3.5–5.3)
PROT SERPL-MCNC: 7.9 G/DL (ref 6.4–8.4)
PROT UR STRIP-MCNC: ABNORMAL MG/DL
RBC # BLD AUTO: 5.06 MILLION/UL (ref 3.88–5.62)
RBC #/AREA URNS AUTO: NORMAL /HPF
SODIUM SERPL-SCNC: 141 MMOL/L (ref 135–147)
SP GR UR STRIP.AUTO: >=1.05 (ref 1–1.03)
UROBILINOGEN UR STRIP-ACNC: <2 MG/DL
WBC # BLD AUTO: 7.64 THOUSAND/UL (ref 4.31–10.16)
WBC #/AREA URNS AUTO: NORMAL /HPF

## 2022-12-05 RX ORDER — TAMSULOSIN HYDROCHLORIDE 0.4 MG/1
0.4 CAPSULE ORAL
Qty: 20 CAPSULE | Refills: 0 | Status: SHIPPED | OUTPATIENT
Start: 2022-12-05

## 2022-12-05 RX ORDER — MORPHINE SULFATE 30 MG/1
15 TABLET ORAL EVERY 6 HOURS PRN
Qty: 9 TABLET | Refills: 0 | Status: SHIPPED | OUTPATIENT
Start: 2022-12-05 | End: 2022-12-05 | Stop reason: ALTCHOICE

## 2022-12-05 RX ORDER — KETOROLAC TROMETHAMINE 30 MG/ML
15 INJECTION, SOLUTION INTRAMUSCULAR; INTRAVENOUS ONCE
Status: COMPLETED | OUTPATIENT
Start: 2022-12-05 | End: 2022-12-05

## 2022-12-05 RX ORDER — IBUPROFEN 600 MG/1
600 TABLET ORAL EVERY 6 HOURS PRN
Qty: 30 TABLET | Refills: 0 | Status: SHIPPED | OUTPATIENT
Start: 2022-12-05

## 2022-12-05 RX ORDER — MORPHINE SULFATE 15 MG/1
15 TABLET ORAL EVERY 6 HOURS PRN
Qty: 12 TABLET | Refills: 0 | Status: SHIPPED | OUTPATIENT
Start: 2022-12-05 | End: 2022-12-15

## 2022-12-05 RX ORDER — ONDANSETRON 2 MG/ML
4 INJECTION INTRAMUSCULAR; INTRAVENOUS ONCE
Status: COMPLETED | OUTPATIENT
Start: 2022-12-05 | End: 2022-12-05

## 2022-12-05 RX ORDER — ONDANSETRON 4 MG/1
4 TABLET, ORALLY DISINTEGRATING ORAL EVERY 8 HOURS PRN
Qty: 20 TABLET | Refills: 0 | Status: SHIPPED | OUTPATIENT
Start: 2022-12-05

## 2022-12-05 RX ORDER — MORPHINE SULFATE 4 MG/ML
4 INJECTION, SOLUTION INTRAMUSCULAR; INTRAVENOUS ONCE
Status: COMPLETED | OUTPATIENT
Start: 2022-12-05 | End: 2022-12-05

## 2022-12-05 RX ADMIN — IOHEXOL 100 ML: 350 INJECTION, SOLUTION INTRAVENOUS at 07:26

## 2022-12-05 RX ADMIN — KETOROLAC TROMETHAMINE 15 MG: 30 INJECTION, SOLUTION INTRAMUSCULAR; INTRAVENOUS at 07:34

## 2022-12-05 RX ADMIN — ONDANSETRON 4 MG: 2 INJECTION INTRAMUSCULAR; INTRAVENOUS at 07:34

## 2022-12-05 RX ADMIN — MORPHINE SULFATE 4 MG: 4 INJECTION INTRAVENOUS at 08:35

## 2022-12-05 RX ADMIN — SODIUM CHLORIDE 1000 ML: 0.9 INJECTION, SOLUTION INTRAVENOUS at 07:34

## 2022-12-05 NOTE — DISCHARGE INSTRUCTIONS
Recommend outpatient Urology follow-up as discussed  Drink plenty of fluids  Ibuprofen as needed for mild-to-moderate pain relief, morphine as needed for breakthrough pain  Take Zofran as directed for relief of nausea, and Flomax daily as directed  Return immediately to the emergency department if you experience any new or worsening symptoms including but not limited to intractable pain or nausea, decrease in urination or inability to urinate, or any other worrisome symptoms

## 2022-12-05 NOTE — Clinical Note
Connie Roberts was seen and treated in our emergency department on 12/5/2022  Diagnosis: Seen in ED    Kusum Avila  may return to work on return date  He may return on this date: 12/07/2022         If you have any questions or concerns, please don't hesitate to call        Markie Guzman PA-C    ______________________________           _______________          _______________  Hospital Representative                              Date                                Time

## 2022-12-05 NOTE — ED PROVIDER NOTES
History  Chief Complaint   Patient presents with   • Abdominal Pain     Pt reports waking with RLQ abd pain approx 2 hours ago  +N/V      Abdirahman Ambriz is a 77-year-old male with no significant past medical history arriving to the emergency department for evaluation with chief complaint of right lower quadrant abdominal pain  Patient states that this had awoken him from sleep approximately 2 hours ago  He had experienced nausea with 1 episode of nonbloody, nonbilious emesis  He reports that the pain is primarily localized to the right lower quadrant though he also is experiencing right lower back pain as well  Denies radiation of pain along either lower extremity, extremity paresthesias or weakness, saddle anesthesia  He denies any initial injury or trauma  Denies recent travel or recent antibiotic use, and has no pertinent surgical history  Denies fevers, chills, sweats, recent illness or sick contact  He denies any UTI symptoms, testicular pain, scrotal edema  Denies history of similar symptoms in the past   He offers no other complaints or concerns at this time  Prior to Admission Medications   Prescriptions Last Dose Informant Patient Reported? Taking?   ibuprofen (MOTRIN) 400 mg tablet   No No   Sig: Take 1 tablet (400 mg total) by mouth every 6 (six) hours as needed for moderate pain (knee pain/initial rx ) for up to 5 days   naproxen (NAPROSYN) 500 mg tablet   No No   Sig: Take 1 tablet (500 mg total) by mouth 2 (two) times a day as needed for mild pain (take with food) for up to 20 doses      Facility-Administered Medications: None       History reviewed  No pertinent past medical history  Past Surgical History:   Procedure Laterality Date   • KNEE SURGERY         History reviewed  No pertinent family history  I have reviewed and agree with the history as documented      E-Cigarette/Vaping   • E-Cigarette Use Never User      E-Cigarette/Vaping Substances     Social History Tobacco Use   • Smoking status: Never   • Smokeless tobacco: Never   Vaping Use   • Vaping Use: Never used   Substance Use Topics   • Alcohol use: No   • Drug use: No       Review of Systems   Constitutional: Negative for chills, diaphoresis, fatigue and fever  Gastrointestinal: Positive for abdominal pain, nausea and vomiting  Negative for constipation and diarrhea  Skin: Negative for rash  Neurological: Negative for weakness  All other systems reviewed and are negative  Physical Exam  Physical Exam  Vitals and nursing note reviewed  Constitutional:       General: He is not in acute distress  Appearance: Normal appearance  He is well-developed  He is not ill-appearing, toxic-appearing or diaphoretic  HENT:      Head: Normocephalic and atraumatic  Right Ear: External ear normal       Left Ear: External ear normal    Eyes:      Conjunctiva/sclera: Conjunctivae normal    Cardiovascular:      Rate and Rhythm: Normal rate and regular rhythm  Pulses: Normal pulses  Pulmonary:      Effort: Pulmonary effort is normal  No respiratory distress  Breath sounds: Normal breath sounds  No wheezing, rhonchi or rales  Chest:      Chest wall: No tenderness  Abdominal:      General: There is no distension  Palpations: Abdomen is soft  Tenderness: There is abdominal tenderness in the right lower quadrant  There is no right CVA tenderness, left CVA tenderness or rebound  Musculoskeletal:         General: Normal range of motion  Cervical back: Normal range of motion and neck supple  Skin:     General: Skin is warm and dry  Capillary Refill: Capillary refill takes less than 2 seconds  Neurological:      Mental Status: He is alert  Motor: Motor function is intact  No weakness  Gait: Gait is intact     Psychiatric:         Mood and Affect: Mood normal          Vital Signs  ED Triage Vitals   Temperature Pulse Respirations Blood Pressure SpO2   12/05/22 0623 12/05/22 0623 12/05/22 0623 12/05/22 0623 12/05/22 0623   98 6 °F (37 °C) 89 18 138/100 97 %      Temp Source Heart Rate Source Patient Position - Orthostatic VS BP Location FiO2 (%)   12/05/22 0623 12/05/22 0623 12/05/22 0623 12/05/22 0623 --   Oral Monitor Sitting Left arm       Pain Score       12/05/22 0734       6           Vitals:    12/05/22 0623   BP: 138/100   Pulse: 89   Patient Position - Orthostatic VS: Sitting         Visual Acuity      ED Medications  Medications   sodium chloride 0 9 % bolus 1,000 mL (0 mL Intravenous Stopped 12/5/22 1034)   ondansetron (ZOFRAN) injection 4 mg (4 mg Intravenous Given 12/5/22 0734)   ketorolac (TORADOL) injection 15 mg (15 mg Intravenous Given 12/5/22 0734)   iohexol (OMNIPAQUE) 350 MG/ML injection (SINGLE-DOSE) 100 mL (100 mL Intravenous Given 12/5/22 0726)   morphine injection 4 mg (4 mg Intravenous Given 12/5/22 0835)       Diagnostic Studies  Results Reviewed     Procedure Component Value Units Date/Time    Urine Microscopic [[id number]]  (Normal) Collected: 12/05/22 0905    Lab Status: Final result Specimen: Urine, Other Updated: 12/05/22 1007     RBC, UA 1-2 /hpf      WBC, UA 1-2 /hpf      Epithelial Cells None Seen /hpf      Bacteria, UA None Seen /hpf     UA w Reflex to Microscopic w Reflex to Culture [[id number]]  (Abnormal) Collected: 12/05/22 0905    Lab Status: Final result Specimen: Urine, Other Updated: 12/05/22 1003     Color, UA Colorless     Clarity, UA Clear     Specific Gravity, UA >=1 050     pH, UA 6 5     Leukocytes, UA Negative     Nitrite, UA Negative     Protein, UA Trace mg/dl      Glucose, UA Negative mg/dl      Ketones, UA Negative mg/dl      Urobilinogen, UA <2 0 mg/dl      Bilirubin, UA Negative     Occult Blood, UA Small    Basic metabolic panel [[id number]]  (Abnormal) Collected: 12/05/22 0646    Lab Status: Final result Specimen: Blood from Arm, Left Updated: 12/05/22 0720     Sodium 141 mmol/L      Potassium 4 2 mmol/L      Chloride 104 mmol/L      CO2 25 mmol/L      ANION GAP 12 mmol/L      BUN 29 mg/dL      Creatinine 1 25 mg/dL      Glucose 141 mg/dL      Calcium 8 9 mg/dL      eGFR 70 ml/min/1 73sq m     Narrative:      [place] guidelines for Chronic Kidney Disease (CKD):   •  Stage 1 with normal or high GFR (GFR > 90 mL/min/1 73 square meters)  •  Stage 2 Mild CKD (GFR = 60-89 mL/min/1 73 square meters)  •  Stage 3A Moderate CKD (GFR = 45-59 mL/min/1 73 square meters)  •  Stage 3B Moderate CKD (GFR = 30-44 mL/min/1 73 square meters)  •  Stage 4 Severe CKD (GFR = 15-29 mL/min/1 73 square meters)  •  Stage 5 End Stage CKD (GFR <15 mL/min/1 73 square meters)  Note: GFR calculation is accurate only with a steady state creatinine    Hepatic function panel [[id number]]  (Normal) Collected: 12/05/22 0646    Lab Status: Final result Specimen: Blood from Arm, Left Updated: 12/05/22 0720     Total Bilirubin 0 44 mg/dL      Bilirubin, Direct 0 10 mg/dL      Alkaline Phosphatase 94 U/L      AST 28 U/L      ALT 41 U/L      Total Protein 7 9 g/dL      Albumin 3 8 g/dL     Lipase [[id number]]  (Normal) Collected: 12/05/22 0646    Lab Status: Final result Specimen: Blood from Arm, Left Updated: 12/05/22 0720     Lipase 226 u/L     CBC and differential [[id number]] Collected: 12/05/22 0646    Lab Status: Final result Specimen: Blood from Arm, Left Updated: 12/05/22 0652     WBC 7 64 Thousand/uL      RBC 5 06 Million/uL      Hemoglobin 14 8 g/dL      Hematocrit 44 5 %      MCV 88 fL      MCH 29 2 pg      MCHC 33 3 g/dL      RDW 13 0 %      MPV 9 7 fL      Platelets 570 Thousands/uL      nRBC 0 /100 WBCs      Neutrophils Relative 75 %      Immat GRANS % 0 %      Lymphocytes Relative 17 %      Monocytes Relative 7 %      Eosinophils Relative 1 %      Basophils Relative 0 %      Neutrophils Absolute 5 72 Thousands/µL      Immature Grans Absolute 0 03 Thousand/uL      Lymphocytes Absolute 1 30 Thousands/µL      Monocytes Absolute 0 51 Thousand/µL      Eosinophils Absolute 0 06 Thousand/µL      Basophils Absolute 0 02 Thousands/µL                  CT abdomen pelvis with contrast   Final Result by Lety Ruano MD (12/05 3036)      There is a 2 to 3 mm calculus at the right ureterovesical junction with associated mild right hydronephrosis  No evidence of appendicitis  Workstation performed: [id number]                    Procedures  Procedures         ED Course                               SBIRT 22yo+    Flowsheet Row Most Recent Value   SBIRT (25 yo +)    In order to provide better care to our patients, we are screening all of our patients for alcohol and drug use  Would it be okay to ask you these screening questions? Yes Filed at: 12/05/2022 7086   Initial Alcohol Screen: US AUDIT-C     1  How often do you have a drink containing alcohol? 0 Filed at: 12/05/2022 0649   2  How many drinks containing alcohol do you have on a typical day you are drinking? 0 Filed at: 12/05/2022 0649   3a  Male UNDER 65: How often do you have five or more drinks on one occasion? 0 Filed at: 12/05/2022 0649   3b  FEMALE Any Age, or MALE 65+: How often do you have 4 or more drinks on one occassion? 0 Filed at: 12/05/2022 0649   Audit-C Score 0 Filed at: 12/05/2022 9030   OLIVIA: How many times in the past year have you    Used an illegal drug or used a prescription medication for non-medical reasons? Never Filed at: 12/05/2022 1313                    MDM  Number of Diagnoses or Management Options  Right ureteral stone: new and requires workup  Diagnosis management comments: This is an otherwise healthy 54-year-old male presenting with right lower quadrant abdominal pain, acute in onset 2 hours ago  States symptoms had awoken him from sleep  Admits to nausea with 1 episode of nonbloody, nonbilious emesis      Differential diagnosis includes but is not limited to:  GERD, acute cholecystitis, choledocholithiasis, hepatitis, pancreatitis, enteritis, colitis, appendicitis, diverticulitis, constipation, bowel obstruction, nephrolithiasis, pyelo     Initial ED plan:  Labs, imaging, UA    Final ED Assessment: Vital signs reviewed on ED presentation, examination as above  All labs and imaging independently reviewed with imaging interpreted by the Radiologist   There is no leukocytosis or electrolyte derangements  Urinalysis shows no evidence of urinary tract infection  CT abdomen/pelvis reports 2-3 mm calculus at the right uvj with mild right hydronephrosis  Test results reviewed with the patient at bedside  We discussed treatment plan to include drinking plenty fluids, Zofran as needed for alleviation of nausea, Flomax, ibuprofen for mild-to-moderate pain relief, and will discharge with short-term morphine prescription for breakthrough pain  Standard narcotic precautions reviewed  Referral placed for close outpatient Urology follow-up for re-evaluation and possible stone analysis  Parameters for ED return discussed at length including but not limited to intractable pain or nausea, decreased urination, onset of UTI symptoms or inability to urinate, or any other worrisome symptoms  The patient had verbalized understanding and was comfortable and agreeable with disposition and care plan  He was discharged in stable condition and had ambulated independently from the emergency department today without issue           Amount and/or Complexity of Data Reviewed  Clinical lab tests: ordered and reviewed  Tests in the radiology section of CPT®: ordered and reviewed  Review and summarize past medical records: yes  Independent visualization of images, tracings, or specimens: yes    Risk of Complications, Morbidity, and/or Mortality  Presenting problems: moderate  Diagnostic procedures: moderate    Patient Progress  Patient progress: stable      Disposition  Final diagnoses:   Right ureteral stone     Time reflects when diagnosis was documented in both MDM as applicable and the Disposition within this note     Time User Action Codes Description Comment    12/5/2022  8:59 AM Sheryle Sartorius Add [N20 1] Right ureteral stone       ED Disposition     ED Disposition   Discharge    Condition   Stable    Date/Time   Mon Dec 5, 2022  8:59 AM    Comment   Kristina South discharge to home/self care                 Follow-up Information     Follow up With Specialties Details Why Contact Info Additional [address] [place] [facility]   [address] [facility] [address] [phone] [facility], [address] [facility] [address], [place], [place], [address]    [facility] Emergency Department Emergency Medicine  If symptoms worsen [address] Emergency Department, [address] [place], [place], [address]          Discharge Medication List as of 12/5/2022 10:26 AM      START taking these medications    Details   morphine (MSIR) 30 MG tablet Take 0 5 tablets (15 mg total) by mouth every 6 (six) hours as needed for severe pain Max Daily Amount: 60 mg, Starting Mon 12/5/2022, Normal      ondansetron (Zofran ODT) 4 mg disintegrating tablet Take 1 tablet (4 mg total) by mouth every 8 (eight) hours as needed for nausea or vomiting, Starting Mon 12/5/2022, Normal      tamsulosin (FLOMAX) 0 4 mg Take 1 capsule (0 4 mg total) by mouth daily with dinner, Starting Mon 12/5/2022, Normal         CONTINUE these medications which have CHANGED    Details   ibuprofen (MOTRIN) 600 mg tablet Take 1 tablet (600 mg total) by mouth every 6 (six) hours as needed for mild pain or moderate pain, Starting Mon 12/5/2022, Normal         CONTINUE these medications which have NOT CHANGED    Details   naproxen (NAPROSYN) 500 mg tablet Take 1 tablet (500 mg total) by mouth 2 (two) times a day as needed for mild pain (take with food) for up to 20 doses, Starting Tue 4/17/2018, Print                 PDMP Review     None          ED Provider  Electronically Signed by           Arden Chahal PA-C  12/05/22 5867

## 2022-12-05 NOTE — Clinical Note
Lilibeth accompanied Pierre Gramajo to the emergency department on 12/5/2022  Return date if applicable: 19/49/3581        If you have any questions or concerns, please don't hesitate to call        Stephane Montana MD

## 2022-12-06 ENCOUNTER — TELEPHONE (OUTPATIENT)
Dept: UROLOGY | Facility: AMBULATORY SURGERY CENTER | Age: 42
End: 2022-12-06

## 2022-12-06 NOTE — TELEPHONE ENCOUNTER
Please Triage  New Patient    What is the reason for the patient’s appointment? ED follow up N20 1 (ICD-10-CM) - Right ureteral stone  He actually passed the stone after he left the hospital     What office location does the patient prefer? [place]    Imaging/Lab Results:    Do we accept the patient's insurance or is the patient Self-Pay? Insurance Provider: [employer]   Plan Type/Number:  Member ID#: Has the patient had any previous Urologist(s)? Urology on [facility] dr Flor Anguiano in July 2021    Have patient records been requested? If not are records showing in Epic:  In epic    Has the patient had any outside testing done? Does the patient have a personal history of cancer?  No  12/28/22

## 2022-12-28 ENCOUNTER — OFFICE VISIT (OUTPATIENT)
Dept: UROLOGY | Facility: CLINIC | Age: 42
End: 2022-12-28

## 2022-12-28 VITALS
HEART RATE: 84 BPM | WEIGHT: 230 LBS | OXYGEN SATURATION: 98 % | HEIGHT: 72 IN | BODY MASS INDEX: 31.15 KG/M2 | DIASTOLIC BLOOD PRESSURE: 96 MMHG | SYSTOLIC BLOOD PRESSURE: 148 MMHG

## 2022-12-28 DIAGNOSIS — R39.11 URINARY HESITANCY: ICD-10-CM

## 2022-12-28 DIAGNOSIS — Z12.5 PROSTATE CANCER SCREENING: ICD-10-CM

## 2022-12-28 DIAGNOSIS — N20.1 RIGHT URETERAL STONE: Primary | ICD-10-CM

## 2022-12-28 LAB
POST-VOID RESIDUAL VOLUME, ML POC: 10 ML
SL AMB  POCT GLUCOSE, UA: NORMAL
SL AMB LEUKOCYTE ESTERASE,UA: NORMAL
SL AMB POCT BILIRUBIN,UA: NORMAL
SL AMB POCT BLOOD,UA: NORMAL
SL AMB POCT CLARITY,UA: CLEAR
SL AMB POCT COLOR,UA: YELLOW
SL AMB POCT KETONES,UA: NORMAL
SL AMB POCT NITRITE,UA: NORMAL
SL AMB POCT PH,UA: 7.5
SL AMB POCT SPECIFIC GRAVITY,UA: 1.01
SL AMB POCT URINE PROTEIN: NORMAL
SL AMB POCT UROBILINOGEN: 0.2

## 2022-12-28 RX ORDER — ROSUVASTATIN CALCIUM 20 MG/1
20 TABLET, COATED ORAL EVERY MORNING
COMMUNITY
Start: 2022-12-03

## 2022-12-28 RX ORDER — TAMSULOSIN HYDROCHLORIDE 0.4 MG/1
0.4 CAPSULE ORAL
Qty: 30 CAPSULE | Refills: 2 | Status: SHIPPED | OUTPATIENT
Start: 2022-12-28

## 2022-12-28 RX ORDER — SERTRALINE HYDROCHLORIDE 100 MG/1
100 TABLET, FILM COATED ORAL EVERY MORNING
COMMUNITY
Start: 2022-11-13

## 2022-12-28 NOTE — PROGRESS NOTES
12/28/2022      Chief Complaint   Patient presents with   • Nephrolithiasis       Assessment and Plan    36 y o  male new patient    1  Obstructing small R UVJ calculus  - CT from 12/5/22 showing - There is a 2 to 3 mm calculus at the right ureterovesical junction with associated mild right hydronephrosis  - Obtain KUB and US to ensure passage of stone  - No pain, passed stone  Stone sent out for analysis  - Recommend dietary modifications and proper hydration to prevent future kidney stone formation  2  Urinary hesitancy/nocturia  - x6 months  - PVR today= 10 mL  - Urine dip negative for blood, leukocytes, or nitrites  - Recommend increased hydration, avoiding bladder irritants, and nighttime fluid restriction  - Discussed trial of Flomax which he is interested in   - JASPER benign  Obtain PSA prior to next visit  - F/u in 6-8 weeks for symptom reassessment  History of Present Illness  Mauricio Musa is a 36 y o  male here for new patient evaluation of kidney stone  He was seen in the ED on 12/5/2022 with right-sided abdominal pain and flank pain  CT completed showed a 3 mm right UVJ calculus with associated mild right hydronephrosis  He was discharged with medical expulsive therapy  Shortly after leaving the hospital patient reportedly passed a kidney stone  He denies any flank pain, abdominal pain, dysuria or hematuria  Denies any prior history of kidney stones  He does also admit to urinary hesitancy and nocturia x3-4 months  Noticed symptoms began after starting Zoloft  Denies any weak stream, difficulty urinating, incontinence  Patient previously had been seen by [facility] urology for paratesticular mass and underwent scrotal exploration and excision of mass in June 2021  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  He reports family history of kidney cancer in brother  No other family history of  malignancy       Review of Systems   Constitutional: Negative for chills and fever    Respiratory: Negative for shortness of breath  Cardiovascular: Negative for chest pain  Gastrointestinal: Negative for abdominal pain  Genitourinary: Positive for frequency (nocturia)  Negative for difficulty urinating, dysuria, flank pain, hematuria and urgency  Neurological: Negative for dizziness  AUA SYMPTOM SCORE    Flowsheet Row Most Recent Value   AUA SYMPTOM SCORE    How often have you had a sensation of not emptying your bladder completely after you finished urinating? 3 (P)     How often have you had to urinate again less than two hours after you finished urinating? 3 (P)     How often have you found you stopped and started again several times when you urinate? 3 (P)     How often have you found it difficult to postpone urination? 0 (P)     How often have you had a weak urinary stream? 2 (P)     How often have you had to push or strain to begin urination? 3 (P)     How many times did you most typically get up to urinate from the time you went to bed at night until the time you got up in the morning? 3 (P)     Quality of Life: If you were to spend the rest of your life with your urinary condition just the way it is now, how would you feel about that? 2 (P)     AUA SYMPTOM SCORE 17 (P)              Past Medical History  History reviewed  No pertinent past medical history  Past Social History  Past Surgical History:   Procedure Laterality Date   • KNEE SURGERY       Social History     Tobacco Use   Smoking Status Never   Smokeless Tobacco Never       Past Family History  History reviewed  No pertinent family history      Past Social history  Social History     Socioeconomic History   • Marital status: /Civil Union     Spouse name: Not on file   • Number of children: Not on file   • Years of education: Not on file   • Highest education level: Not on file   Occupational History   • Not on file   Tobacco Use   • Smoking status: Never   • Smokeless tobacco: Never   Vaping Use • Vaping Use: Never used   Substance and Sexual Activity   • Alcohol use: No   • Drug use: No   • Sexual activity: Not on file   Other Topics Concern   • Not on file   Social History Narrative   • Not on file     Social Determinants of Health     Financial Resource Strain: Not on file   Food Insecurity: Not on file   Transportation Needs: Not on file   Physical Activity: Not on file   Stress: Not on file   Social Connections: Not on file   Intimate Partner Violence: Not on file   Housing Stability: Not on file       Current Medications  Current Outpatient Medications   Medication Sig Dispense Refill   • ibuprofen (MOTRIN) 600 mg tablet Take 1 tablet (600 mg total) by mouth every 6 (six) hours as needed for mild pain or moderate pain 30 tablet 0   • naproxen (NAPROSYN) 500 mg tablet Take 1 tablet (500 mg total) by mouth 2 (two) times a day as needed for mild pain (take with food) for up to 20 doses 20 tablet 0   • ondansetron (Zofran ODT) 4 mg disintegrating tablet Take 1 tablet (4 mg total) by mouth every 8 (eight) hours as needed for nausea or vomiting 20 tablet 0   • rosuvastatin (CRESTOR) 20 MG tablet Take 20 mg by mouth every morning     • sertraline (ZOLOFT) 100 mg tablet Take 100 mg by mouth every morning     • tamsulosin (FLOMAX) 0 4 mg Take 1 capsule (0 4 mg total) by mouth daily with dinner 20 capsule 0     No current facility-administered medications for this visit  Allergies  Allergies   Allergen Reactions   • Oxycodone-Acetaminophen Hives         The following portions of the patient's history were reviewed and updated as appropriate: allergies, current medications, past medical history, past social history, past surgical history and problem list       Vitals  Vitals:    12/28/22 0817   BP: 148/96   Pulse: 84   SpO2: 98%   Weight: 104 kg (230 lb)   Height: 6' (1 829 m)           Physical Exam  Physical Exam  Constitutional:       Appearance: Normal appearance     HENT:      Head: Normocephalic and atraumatic  Right Ear: External ear normal       Left Ear: External ear normal       Nose: Nose normal    Eyes:      General: No scleral icterus  Conjunctiva/sclera: Conjunctivae normal    Cardiovascular:      Pulses: Normal pulses  Pulmonary:      Effort: Pulmonary effort is normal    Genitourinary:     Comments: Prostate approx 30 g without nodules or tenderness  Musculoskeletal:         General: Normal range of motion  Cervical back: Normal range of motion  Skin:     General: Skin is warm and dry  Neurological:      General: No focal deficit present  Mental Status: He is alert and oriented to person, place, and time  Psychiatric:         Mood and Affect: Mood normal          Behavior: Behavior normal          Thought Content: Thought content normal          Judgment: Judgment normal            Results  No results found for this or any previous visit (from the past 1 hour(s))  ]  No results found for: PSA  Lab Results   Component Value Date    CALCIUM 8 9 12/05/2022    K 4 2 12/05/2022    CO2 25 12/05/2022     12/05/2022    BUN 29 (H) 12/05/2022    CREATININE 1 25 12/05/2022     Lab Results   Component Value Date    WBC 7 64 12/05/2022    HGB 14 8 12/05/2022    HCT 44 5 12/05/2022    MCV 88 12/05/2022     12/05/2022           Orders  Orders Placed This Encounter   Procedures   • US kidney and bladder     Standing Status:   Future     Standing Expiration Date:   12/28/2026     Scheduling Instructions:      "Prep required if being scheduled in conjunction with other studies, refer to those examination's Preps first before scheduling  All patients for US Kidney and Bladder they must drink 24 oz of water 60 minutes before your scheduled appointment time  This test requires you to have a FULL bladder  Please do not urinate before your test             If you have difficulty holding your urine please arrive 30 minutes early to complete your drinking prep              You may take all of your medications for this test             Pediatric Preps-birth to 12 years             Infants and toddlers to 1 year of age- no drinking prep or restrictions             Toddlers (33 year old)- just give liquids , any clear liquid or juice, and hour prior to the exam             3years old and older- drink liquids (approx  16 to 24 oz and no soda) 30 minutes before, try to not let the child void until the test is completed            Please bring your insurance cards, a form of photo ID and a list of yourmedications with you  Arrive 15 minutes prior to your appointment time in order to register  If you need to have lab work or a urinalysis, please do this AFTER your ultrasound  "            To schedule this appointment, please contact Central Scheduling at [phone]  Order Specific Question:   Is a Renal Artery Doppler also being requested in addition to the Kidney/Renal ultrasound ? Answer:   No   • XR abdomen 1 view kub     Standing Status:   Future     Standing Expiration Date:   12/28/2026     Scheduling Instructions:      Bring along any outside films relating to this procedure               Eusebio Carey

## 2023-02-08 ENCOUNTER — HOSPITAL ENCOUNTER (OUTPATIENT)
Dept: RADIOLOGY | Facility: HOSPITAL | Age: 43
Discharge: HOME | End: 2023-02-08

## 2023-02-08 ENCOUNTER — APPOINTMENT (OUTPATIENT)
Dept: LAB | Facility: HOSPITAL | Age: 43
End: 2023-02-08

## 2023-02-08 ENCOUNTER — HOSPITAL ENCOUNTER (OUTPATIENT)
Dept: ULTRASOUND IMAGING | Facility: HOSPITAL | Age: 43
Discharge: HOME | End: 2023-02-08

## 2023-02-08 DIAGNOSIS — N20.1 RIGHT URETERAL STONE: ICD-10-CM

## 2023-02-08 DIAGNOSIS — Z12.5 PROSTATE CANCER SCREENING: ICD-10-CM

## 2023-02-08 LAB — PSA SERPL-MCNC: 1.2 NG/ML (ref 0–4)

## 2023-02-22 ENCOUNTER — OFFICE VISIT (OUTPATIENT)
Dept: UROLOGY | Facility: CLINIC | Age: 43
End: 2023-02-22

## 2023-02-22 VITALS
BODY MASS INDEX: 31.69 KG/M2 | WEIGHT: 234 LBS | DIASTOLIC BLOOD PRESSURE: 76 MMHG | HEIGHT: 72 IN | SYSTOLIC BLOOD PRESSURE: 118 MMHG | HEART RATE: 74 BPM | OXYGEN SATURATION: 94 %

## 2023-02-22 DIAGNOSIS — N20.1 RIGHT URETERAL STONE: ICD-10-CM

## 2023-02-22 DIAGNOSIS — R39.11 URINARY HESITANCY: Primary | ICD-10-CM

## 2023-02-22 LAB — POST-VOID RESIDUAL VOLUME, ML POC: 28 ML

## 2023-02-22 RX ORDER — TAMSULOSIN HYDROCHLORIDE 0.4 MG/1
0.4 CAPSULE ORAL
Qty: 90 CAPSULE | Refills: 1 | Status: SHIPPED | OUTPATIENT
Start: 2023-02-22

## 2023-06-26 ENCOUNTER — PROCEDURE VISIT (OUTPATIENT)
Dept: UROLOGY | Facility: CLINIC | Age: 43
End: 2023-06-26
Payer: COMMERCIAL

## 2023-06-26 VITALS
HEIGHT: 72 IN | SYSTOLIC BLOOD PRESSURE: 124 MMHG | HEART RATE: 86 BPM | BODY MASS INDEX: 33.05 KG/M2 | OXYGEN SATURATION: 98 % | WEIGHT: 244 LBS | DIASTOLIC BLOOD PRESSURE: 88 MMHG

## 2023-06-26 DIAGNOSIS — R39.11 URINARY HESITANCY: Primary | ICD-10-CM

## 2023-06-26 PROCEDURE — 52000 CYSTOURETHROSCOPY: CPT | Performed by: UROLOGY

## 2023-06-26 RX ORDER — NEOMYCIN SULFATE, POLYMYXIN B SULFATE AND DEXAMETHASONE 3.5; 10000; 1 MG/ML; [USP'U]/ML; MG/ML
SUSPENSION/ DROPS OPHTHALMIC
COMMUNITY
Start: 2023-06-19

## 2023-06-26 NOTE — PROGRESS NOTES
Cystoscopy     Date/Time 6/26/2023 3:00 PM     Performed by  Maeve Cordon MD   Authorized by Maeve Cordon MD       Angelia Grubbs is a 39-year-old male who presents for assessment of his bladder outlet  He complains of hesitancy of urination  When he does void he has an adequate urinary stream   He is currently on tamsulosin but is unsure how well this is helping his lower urinary tract symptoms  Recent CT scan shows his prostate measures 4 1 x 4 4 x 4 0 cm  His estimated prostate volume is 36 g  Male cystoscopy procedure note:  Risk and benefits of flexible cystoscopy were discussed  Informed consent was obtained  The patient was placed in the supine position  His genitalia was prepped and draped in a sterile fashion  Viscous lidocaine jelly was instilled into the urethra and flexible cystoscopy was then performed  The urethra, prostatic urethra, and bladder were all thoroughly inspected there was no evidence of mucosal abnormalities or lesions  Both ureteral orifices were visualized with clear efflux of urine  Prostatic urethral channel was relatively short with minimal coaptation of the lateral lobes  Retroflexion was normal without median lobe  Overall this was a negative cystoscopy for urothelial carcinoma or significant BPH  Impression: Voiding dysfunction, no significant bladder outlet obstruction on cystoscopy    Plan: I recommend continuing the tamsulosin at this time  I recommend follow-up in the next 6 to 8 weeks with advanced practitioner with postvoid residual assessment and uroflow evaluation to assess continuing the tamsulosin

## 2025-01-07 ENCOUNTER — HOSPITAL ENCOUNTER (EMERGENCY)
Facility: HOSPITAL | Age: 45
Discharge: HOME | End: 2025-01-07
Attending: EMERGENCY MEDICINE
Payer: COMMERCIAL

## 2025-01-07 ENCOUNTER — APPOINTMENT (EMERGENCY)
Dept: CT IMAGING | Facility: HOSPITAL | Age: 45
End: 2025-01-07
Payer: COMMERCIAL

## 2025-01-07 VITALS
SYSTOLIC BLOOD PRESSURE: 154 MMHG | HEART RATE: 110 BPM | DIASTOLIC BLOOD PRESSURE: 100 MMHG | OXYGEN SATURATION: 98 % | RESPIRATION RATE: 20 BRPM | TEMPERATURE: 98.4 F

## 2025-01-07 DIAGNOSIS — M47.816 SPONDYLOSIS OF LUMBAR SPINE: ICD-10-CM

## 2025-01-07 DIAGNOSIS — S39.012A STRAIN OF LUMBAR REGION: ICD-10-CM

## 2025-01-07 DIAGNOSIS — M54.50 ACUTE LOW BACK PAIN: Primary | ICD-10-CM

## 2025-01-07 PROCEDURE — 99283 EMERGENCY DEPT VISIT LOW MDM: CPT

## 2025-01-07 PROCEDURE — 99284 EMERGENCY DEPT VISIT MOD MDM: CPT | Performed by: EMERGENCY MEDICINE

## 2025-01-07 PROCEDURE — 72131 CT LUMBAR SPINE W/O DYE: CPT

## 2025-01-07 PROCEDURE — 96372 THER/PROPH/DIAG INJ SC/IM: CPT

## 2025-01-07 RX ORDER — LIDOCAINE 50 MG/G
1 PATCH TOPICAL ONCE
Status: DISCONTINUED | OUTPATIENT
Start: 2025-01-07 | End: 2025-01-07 | Stop reason: HOSPADM

## 2025-01-07 RX ORDER — CELECOXIB 200 MG/1
CAPSULE ORAL
Qty: 30 CAPSULE | Refills: 0 | Status: SHIPPED | OUTPATIENT
Start: 2025-01-07

## 2025-01-07 RX ORDER — KETOROLAC TROMETHAMINE 30 MG/ML
30 INJECTION, SOLUTION INTRAMUSCULAR; INTRAVENOUS ONCE
Status: COMPLETED | OUTPATIENT
Start: 2025-01-07 | End: 2025-01-07

## 2025-01-07 RX ORDER — ACETAMINOPHEN 325 MG/1
650 TABLET ORAL ONCE
Status: COMPLETED | OUTPATIENT
Start: 2025-01-07 | End: 2025-01-07

## 2025-01-07 RX ORDER — TIZANIDINE 2 MG/1
4 TABLET ORAL ONCE
Status: COMPLETED | OUTPATIENT
Start: 2025-01-07 | End: 2025-01-07

## 2025-01-07 RX ADMIN — ACETAMINOPHEN 650 MG: 325 TABLET, FILM COATED ORAL at 14:03

## 2025-01-07 RX ADMIN — LIDOCAINE 1 PATCH: 700 PATCH TOPICAL at 14:02

## 2025-01-07 RX ADMIN — KETOROLAC TROMETHAMINE 30 MG: 30 INJECTION, SOLUTION INTRAMUSCULAR at 14:04

## 2025-01-07 RX ADMIN — TIZANIDINE 4 MG: 2 TABLET ORAL at 14:03

## 2025-01-07 NOTE — ED PROVIDER NOTES
Time reflects when diagnosis was documented in both MDM as applicable and the Disposition within this note       Time User Action Codes Description Comment    1/7/2025  2:53 PM Shahla Orozco [M54.50] Acute low back pain     1/7/2025  2:53 PM Shahla Orozco [S39.012A] Strain of lumbar region     1/7/2025  2:54 PM Shahla Orozco [M47.816] Spondylosis of lumbar spine           ED Disposition       ED Disposition   Discharge    Condition   Stable    Date/Time   Tue Jan 7, 2025  2:54 PM    Comment   Juanito Batres discharge to home/self care.                   Assessment & Plan       Medical Decision Making  44-year-old male presents to the ED for acute low back pain that started 4 days ago.  Suspect muscle strain, possible mild muscle spasm.  Will obtain CT imaging to rule out disc herniation, compression deformity or other major abnormality.  Although patient feels weak in both legs, he has no gross motor or sensory deficits on examination and no other concerning red flag signs.  Very low clinical suspicion for cauda equina syndrome.  Will treat symptomatically with IM Toradol, Tylenol, Zanaflex and lidocaine patch.    Amount and/or Complexity of Data Reviewed  Radiology: ordered. Decision-making details documented in ED Course.    Risk  OTC drugs.  Prescription drug management.        ED Course as of 01/07/25 1456   Tue Jan 07, 2025   1453 Updated patient about CT findings.  Discussed likelihood of lumbar strain, possibly exacerbated by existing degenerative changes.  Discussed symptomatic management at home.  Will prescribe Celebrex and advised addition of Tylenol, over-the-counter Salonpas patches, heating pad as well as the Zanaflex that his PCP has already prescribed.  Discussed ED return parameters.       Medications   lidocaine (LIDODERM) 5 % patch 1 patch (1 patch Topical Medication Applied 1/7/25 1402)   acetaminophen (TYLENOL) tablet 650 mg (650 mg Oral Given 1/7/25 1403)   ketorolac  (TORADOL) injection 30 mg (30 mg Intramuscular Given 25 1404)   tiZANidine (ZANAFLEX) tablet 4 mg (4 mg Oral Given 25 1403)       ED Risk Strat Scores                                              History of Present Illness       Chief Complaint   Patient presents with    Back Pain     Lower back pain since . No injury he can recall. Last week was exp neck pain that he was placed on muscle relaxers by PCP.       Past Medical History:   Diagnosis Date    Erectile dysfunction About  6 months    Kidney stone 21      Past Surgical History:   Procedure Laterality Date    KNEE SURGERY        Family History   Problem Relation Age of Onset    Diabetes Father          in 04    Cancer Mother         Ovarian cancer    No Known Problems Brother     No Known Problems Brother     No Known Problems Brother     No Known Problems Sister     No Known Problems Sister     No Known Problems Sister       Social History     Tobacco Use    Smoking status: Never     Passive exposure: Past    Smokeless tobacco: Never   Vaping Use    Vaping status: Never Used   Substance Use Topics    Alcohol use: No    Drug use: No      E-Cigarette/Vaping    E-Cigarette Use Never User       E-Cigarette/Vaping Substances    Nicotine No     THC No     CBD No     Flavoring No     Other No     Unknown No       I have reviewed and agree with the history as documented.     Patient is a 44-year-old male with past medical history of hyperlipidemia, prior kidney stone, presents to the emergency department for acute low back pain that started on Saturday.  Patient states that prior to that he was having pain in his upper back and his doctor prescribed him a muscle relaxer which she believes the Zanaflex.  He states that back pain overall is improving but since Saturday he started feeling pain in his lumbar region both midline and to the left of midline.  Pain feels like a pressure in his back and states he has had similar pain in the past but  these seems to be worse than normal.  He reports he does do occasional lifting at work on a daily basis but denies any injury or more strenuous activity than normal.  He also reports feeling a pinching type pain in the right thoracic region.  He denies any radiation around to the abdomen, down the arms or legs.  He states over the past couple of days he started to feel as though the muscles in both legs were weaker than normal.  Denies any falls or his legs giving out on him.  Denies any lower extremity paresthesia, saddle anesthesia, urinary or fecal retention or incontinence.  He states it does hurt his low back when he strains to have a bowel movement but is still able to produce BMs.  He has been taking Tylenol with last dose at around 8 AM today.  He has not taken the Zanaflex for the low back pain as of yet.  He denies any associated fevers or chills, headache, dizziness or near syncope, cough, URI symptoms, chest pain, shortness of breath, palpitations, abdominal pain or distention, nausea, vomiting, diarrhea, constipation, dysuria, change in frequency, gross hematuria, skin rash or color change, leg swelling, other focal neurologic deficits.  Patient states this feels much different than prior kidney stone related pain.      History provided by:  Patient   used: No    Back Pain  Associated symptoms: weakness    Associated symptoms: no abdominal pain, no chest pain, no dysuria, no fever, no headaches and no numbness        Review of Systems   Constitutional:  Negative for chills and fever.   HENT:  Negative for congestion, ear pain, rhinorrhea and sore throat.    Respiratory:  Negative for cough, chest tightness, shortness of breath and wheezing.    Cardiovascular:  Negative for chest pain and palpitations.   Gastrointestinal:  Negative for abdominal pain, constipation, diarrhea, nausea and vomiting.   Genitourinary:  Negative for difficulty urinating, dysuria, flank pain, frequency and  hematuria.   Musculoskeletal:  Positive for back pain. Negative for neck pain and neck stiffness.   Skin:  Negative for color change, pallor, rash and wound.   Allergic/Immunologic: Negative for immunocompromised state.   Neurological:  Positive for weakness. Negative for dizziness, syncope, light-headedness, numbness and headaches.   Hematological:  Negative for adenopathy. Does not bruise/bleed easily.   Psychiatric/Behavioral:  Negative for confusion and decreased concentration.    All other systems reviewed and are negative.          Objective       ED Triage Vitals   Temperature Pulse Blood Pressure Respirations SpO2 Patient Position - Orthostatic VS   01/07/25 1340 01/07/25 1340 01/07/25 1340 01/07/25 1340 01/07/25 1340 01/07/25 1340   98.4 °F (36.9 °C) (!) 110 154/100 20 98 % Sitting      Temp Source Heart Rate Source BP Location FiO2 (%) Pain Score    01/07/25 1340 01/07/25 1340 01/07/25 1340 -- 01/07/25 1403    Temporal Monitor Left arm  10 - Worst Possible Pain      Vitals      Date and Time Temp Pulse SpO2 Resp BP Pain Score FACES Pain Rating User   01/07/25 1404 -- -- -- -- -- 10 - Worst Possible Pain -- FB   01/07/25 1403 -- -- -- -- -- 10 - Worst Possible Pain -- FB   01/07/25 1340 98.4 °F (36.9 °C) 110 98 % 20 154/100 -- -- GP          Vitals:    01/07/25 1340   BP: 154/100   BP Location: Left arm   Pulse: (!) 110   Resp: 20   Temp: 98.4 °F (36.9 °C)   TempSrc: Temporal   SpO2: 98%        Physical Exam  Vitals and nursing note reviewed.   Constitutional:       General: He is not in acute distress.     Appearance: Normal appearance. He is well-developed. He is not ill-appearing, toxic-appearing or diaphoretic.   HENT:      Head: Normocephalic and atraumatic.      Right Ear: External ear normal.      Left Ear: External ear normal.      Nose: Nose normal.      Mouth/Throat:      Mouth: Mucous membranes are moist.      Pharynx: Oropharynx is clear.   Eyes:      Extraocular Movements: Extraocular movements  intact.      Conjunctiva/sclera: Conjunctivae normal.   Neck:      Vascular: No JVD.   Cardiovascular:      Rate and Rhythm: Normal rate and regular rhythm.      Pulses: Normal pulses.      Heart sounds: Normal heart sounds. No murmur heard.     No friction rub. No gallop.   Pulmonary:      Effort: Pulmonary effort is normal. No respiratory distress.      Breath sounds: Normal breath sounds. No wheezing, rhonchi or rales.   Abdominal:      General: There is no distension.      Palpations: Abdomen is soft.      Tenderness: There is no abdominal tenderness. There is no guarding or rebound.   Musculoskeletal:         General: Tenderness present. No swelling. Normal range of motion.      Cervical back: Normal range of motion and neck supple. No rigidity or tenderness.      Comments: Mild tenderness in the midline lumbar spine as well as the left lumbar paravertebral musculature.  There is mild tenderness in the right thoracic paravertebral musculature.   Skin:     General: Skin is warm and dry.      Coloration: Skin is not pale.      Findings: No erythema or rash.   Neurological:      General: No focal deficit present.      Mental Status: He is alert and oriented to person, place, and time.      Sensory: No sensory deficit.      Motor: No weakness.      Deep Tendon Reflexes: Reflexes normal.      Comments: 5/5 strength throughout.   Psychiatric:         Mood and Affect: Mood normal.         Behavior: Behavior normal.         Results Reviewed       None            CT spine lumbar without contrast   Final Interpretation by E. Alec Schoenberger, MD (01/07 4425)   Mild degenerative spondylosis with mild left foraminal stenosis at L4-5. No other significant canal or foraminal stenosis      Workstation performed: [id number]             Procedures    ED Medication and Procedure Management   Prior to Admission Medications   Prescriptions Last Dose Informant Patient Reported? Taking?   ibuprofen (MOTRIN) 600 mg tablet  Self No  No   Sig: Take 1 tablet (600 mg total) by mouth every 6 (six) hours as needed for mild pain or moderate pain   Patient not taking: Reported on 2/22/2023   naproxen (NAPROSYN) 500 mg tablet  Self No No   Sig: Take 1 tablet (500 mg total) by mouth 2 (two) times a day as needed for mild pain (take with food) for up to 20 doses   Patient not taking: Reported on 2/22/2023   neomycin-polymyxin-dexamethasone (MAXITROL) ophthalmic suspension  Self Yes No   Sig: INSTILL ONE DROP INTO BOTH EYES FOUR TIMES A DAY   Patient not taking: Reported on 6/26/2023   ondansetron (Zofran ODT) 4 mg disintegrating tablet  Self No No   Sig: Take 1 tablet (4 mg total) by mouth every 8 (eight) hours as needed for nausea or vomiting   Patient not taking: Reported on 2/22/2023   rosuvastatin (CRESTOR) 20 MG tablet  Self Yes No   Sig: Take 20 mg by mouth every morning   sertraline (ZOLOFT) 100 mg tablet  Self Yes No   Sig: Take 100 mg by mouth every morning   tamsulosin (FLOMAX) 0.4 mg  Self No No   Sig: Take 1 capsule (0.4 mg total) by mouth daily with dinner      Facility-Administered Medications: None     Patient's Medications   Discharge Prescriptions    CELECOXIB (CELEBREX) 200 MG CAPSULE    Take 1 tablet by mouth every 12-24 hours as needed for moderate pain.  Take with food and avoid use with other NSAIDs.       Start Date: 1/7/2025  End Date: --       Order Dose: --       Quantity: 30 capsule    Refills: 0       ED SEPSIS DOCUMENTATION   Time reflects when diagnosis was documented in both MDM as applicable and the Disposition within this note       Time User Action Codes Description Comment    1/7/2025  2:53 PM Shahla Orozco [M54.50] Acute low back pain     1/7/2025  2:53 PM Shahla Orozco [S39.012A] Strain of lumbar region     1/7/2025  2:54 PM Shahla Orozco [M47.816] Spondylosis of lumbar spine                  Shahla Orozco DO  01/07/25 1456

## 2025-01-07 NOTE — Clinical Note
Juanito Batres was seen and treated in our emergency department on 1/7/2025.    No restrictions            Diagnosis:     Juanito  may return to work on return date.    He may return on this date: 01/09/2025         If you have any questions or concerns, please don't hesitate to call.      Shahla Orozco, DO    ______________________________           _______________          _______________  Hospital Representative                              Date                                Time

## 2025-01-08 ENCOUNTER — TELEPHONE (OUTPATIENT)
Dept: PHYSICAL THERAPY | Facility: OTHER | Age: 45
End: 2025-01-08

## 2025-01-17 NOTE — TELEPHONE ENCOUNTER
Call placed to the patient per [facility] referral.     V/M left for patient to call back. Phone number and hours of business provided.      This is the 2nd attempt to reach the patient.  Will defer per protocol.